# Patient Record
Sex: FEMALE | Race: OTHER | NOT HISPANIC OR LATINO
[De-identification: names, ages, dates, MRNs, and addresses within clinical notes are randomized per-mention and may not be internally consistent; named-entity substitution may affect disease eponyms.]

---

## 2017-03-02 ENCOUNTER — APPOINTMENT (OUTPATIENT)
Dept: PEDIATRICS | Facility: CLINIC | Age: 22
End: 2017-03-02

## 2017-03-02 VITALS
WEIGHT: 146.8 LBS | DIASTOLIC BLOOD PRESSURE: 83 MMHG | SYSTOLIC BLOOD PRESSURE: 119 MMHG | BODY MASS INDEX: 28.45 KG/M2 | HEIGHT: 60.24 IN | HEART RATE: 114 BPM

## 2017-03-02 DIAGNOSIS — E66.3 OVERWEIGHT: ICD-10-CM

## 2017-03-02 DIAGNOSIS — Z00.00 ENCOUNTER FOR GENERAL ADULT MEDICAL EXAMINATION W/OUT ABNORMAL FINDINGS: ICD-10-CM

## 2017-03-02 DIAGNOSIS — F90.9 ATTENTION-DEFICIT HYPERACTIVITY DISORDER, UNSPECIFIED TYPE: ICD-10-CM

## 2017-03-09 LAB
ALT SERPL-CCNC: 11 U/L
AST SERPL-CCNC: 19 U/L
BASOPHILS # BLD AUTO: 0.04 K/UL
BASOPHILS NFR BLD AUTO: 0.4 %
C TRACH RRNA SPEC QL NAA+PROBE: NORMAL
CHOLEST SERPL-MCNC: 173 MG/DL
CHOLEST/HDLC SERPL: 2.3 RATIO
EOSINOPHIL # BLD AUTO: 0.21 K/UL
EOSINOPHIL NFR BLD AUTO: 2.3 %
GLUCOSE BS SERPL-MCNC: 89 MG/DL
HBA1C MFR BLD HPLC: 5.3 %
HCT VFR BLD CALC: 45.1 %
HDLC SERPL-MCNC: 75 MG/DL
HGB BLD-MCNC: 15.2 G/DL
HIV1+2 AB SPEC QL IA.RAPID: NONREACTIVE
IMM GRANULOCYTES NFR BLD AUTO: 0.2 %
LDLC SERPL CALC-MCNC: 85 MG/DL
LYMPHOCYTES # BLD AUTO: 3.1 K/UL
LYMPHOCYTES NFR BLD AUTO: 33.3 %
MAN DIFF?: NORMAL
MCHC RBC-ENTMCNC: 26.5 PG
MCHC RBC-ENTMCNC: 33.7 GM/DL
MCV RBC AUTO: 78.7 FL
MONOCYTES # BLD AUTO: 0.52 K/UL
MONOCYTES NFR BLD AUTO: 5.6 %
N GONORRHOEA RRNA SPEC QL NAA+PROBE: NORMAL
NEUTROPHILS # BLD AUTO: 5.41 K/UL
NEUTROPHILS NFR BLD AUTO: 58.2 %
PLATELET # BLD AUTO: 274 K/UL
RBC # BLD: 5.73 M/UL
RBC # FLD: 14.9 %
SOURCE AMPLIFICATION: NORMAL
TESTOST SERPL-MCNC: 76.7 NG/DL
TRIGL SERPL-MCNC: 64 MG/DL
TSH SERPL-ACNC: 1.25 UIU/ML
WBC # FLD AUTO: 9.3 K/UL

## 2017-03-23 ENCOUNTER — APPOINTMENT (OUTPATIENT)
Dept: PEDIATRICS | Facility: CLINIC | Age: 22
End: 2017-03-23

## 2017-03-23 VITALS — WEIGHT: 145 LBS

## 2017-03-23 DIAGNOSIS — E28.2 POLYCYSTIC OVARIAN SYNDROME: ICD-10-CM

## 2017-03-23 DIAGNOSIS — N92.6 IRREGULAR MENSTRUATION, UNSPECIFIED: ICD-10-CM

## 2017-03-23 LAB
HCG UR QL: NEGATIVE
QUALITY CONTROL: YES

## 2017-03-23 RX ORDER — NORGESTIMATE AND ETHINYL ESTRADIOL 7DAYSX3 LO
0.18/0.215/0.25 KIT ORAL DAILY
Qty: 1 | Refills: 3 | Status: ACTIVE | COMMUNITY
Start: 2017-03-23 | End: 1900-01-01

## 2017-07-10 ENCOUNTER — APPOINTMENT (OUTPATIENT)
Dept: PEDIATRICS | Facility: CLINIC | Age: 22
End: 2017-07-10

## 2020-10-29 ENCOUNTER — INPATIENT (INPATIENT)
Facility: HOSPITAL | Age: 25
LOS: 5 days | Discharge: HOME | DRG: 872 | End: 2020-11-04
Attending: INTERNAL MEDICINE | Admitting: INTERNAL MEDICINE
Payer: COMMERCIAL

## 2020-10-29 VITALS
OXYGEN SATURATION: 97 % | TEMPERATURE: 99 F | HEIGHT: 61.5 IN | WEIGHT: 199.96 LBS | RESPIRATION RATE: 20 BRPM | HEART RATE: 132 BPM

## 2020-10-29 DIAGNOSIS — F31.9 BIPOLAR DISORDER, UNSPECIFIED: Chronic | ICD-10-CM

## 2020-10-29 LAB
ALBUMIN SERPL ELPH-MCNC: 4.7 G/DL — SIGNIFICANT CHANGE UP (ref 3.3–5)
ALP SERPL-CCNC: 119 U/L — SIGNIFICANT CHANGE UP (ref 40–120)
ALT FLD-CCNC: 69 U/L — HIGH (ref 10–45)
ANION GAP SERPL CALC-SCNC: 15 MMOL/L — SIGNIFICANT CHANGE UP (ref 5–17)
ANISOCYTOSIS BLD QL: SLIGHT — SIGNIFICANT CHANGE UP
APPEARANCE UR: ABNORMAL
APTT BLD: 34.4 SEC — SIGNIFICANT CHANGE UP (ref 27.5–35.5)
AST SERPL-CCNC: 64 U/L — HIGH (ref 10–40)
BACTERIA # UR AUTO: ABNORMAL
BASOPHILS # BLD AUTO: 0 K/UL — SIGNIFICANT CHANGE UP (ref 0–0.2)
BASOPHILS NFR BLD AUTO: 0 % — SIGNIFICANT CHANGE UP (ref 0–2)
BILIRUB SERPL-MCNC: 0.3 MG/DL — SIGNIFICANT CHANGE UP (ref 0.2–1.2)
BILIRUB UR-MCNC: NEGATIVE — SIGNIFICANT CHANGE UP
BUN SERPL-MCNC: 11 MG/DL — SIGNIFICANT CHANGE UP (ref 7–23)
CALCIUM SERPL-MCNC: 10.4 MG/DL — SIGNIFICANT CHANGE UP (ref 8.4–10.5)
CHLORIDE SERPL-SCNC: 97 MMOL/L — SIGNIFICANT CHANGE UP (ref 96–108)
CO2 SERPL-SCNC: 20 MMOL/L — LOW (ref 22–31)
COLOR SPEC: YELLOW — SIGNIFICANT CHANGE UP
CREAT SERPL-MCNC: 0.66 MG/DL — SIGNIFICANT CHANGE UP (ref 0.5–1.3)
DIFF PNL FLD: ABNORMAL
EOSINOPHIL # BLD AUTO: 0.54 K/UL — HIGH (ref 0–0.5)
EOSINOPHIL NFR BLD AUTO: 3 % — SIGNIFICANT CHANGE UP (ref 0–6)
EPI CELLS # UR: 2 /HPF — SIGNIFICANT CHANGE UP
GLUCOSE SERPL-MCNC: 103 MG/DL — HIGH (ref 70–99)
GLUCOSE UR QL: NEGATIVE — SIGNIFICANT CHANGE UP
HCT VFR BLD CALC: 43.1 % — SIGNIFICANT CHANGE UP (ref 34.5–45)
HGB BLD-MCNC: 14.3 G/DL — SIGNIFICANT CHANGE UP (ref 11.5–15.5)
HYALINE CASTS # UR AUTO: 17 /LPF — HIGH (ref 0–2)
INR BLD: 1.1 RATIO — SIGNIFICANT CHANGE UP (ref 0.88–1.16)
KETONES UR-MCNC: NEGATIVE — SIGNIFICANT CHANGE UP
LACTATE BLDV-MCNC: 1.8 MMOL/L — SIGNIFICANT CHANGE UP (ref 0.7–2)
LACTATE BLDV-MCNC: 2.2 MMOL/L — HIGH (ref 0.7–2)
LEUKOCYTE ESTERASE UR-ACNC: ABNORMAL
LYMPHOCYTES # BLD AUTO: 13 % — SIGNIFICANT CHANGE UP (ref 13–44)
LYMPHOCYTES # BLD AUTO: 2.36 K/UL — SIGNIFICANT CHANGE UP (ref 1–3.3)
MANUAL SMEAR VERIFICATION: SIGNIFICANT CHANGE UP
MCHC RBC-ENTMCNC: 26 PG — LOW (ref 27–34)
MCHC RBC-ENTMCNC: 33.2 GM/DL — SIGNIFICANT CHANGE UP (ref 32–36)
MCV RBC AUTO: 78.4 FL — LOW (ref 80–100)
METAMYELOCYTES # FLD: 1 % — HIGH (ref 0–0)
MICROCYTES BLD QL: SIGNIFICANT CHANGE UP
MONOCYTES # BLD AUTO: 0.91 K/UL — HIGH (ref 0–0.9)
MONOCYTES NFR BLD AUTO: 5 % — SIGNIFICANT CHANGE UP (ref 2–14)
MYELOCYTES NFR BLD: 1 % — HIGH (ref 0–0)
NEUTROPHILS # BLD AUTO: 11.8 K/UL — HIGH (ref 1.8–7.4)
NEUTROPHILS NFR BLD AUTO: 64 % — SIGNIFICANT CHANGE UP (ref 43–77)
NEUTS BAND # BLD: 1 % — SIGNIFICANT CHANGE UP (ref 0–8)
NITRITE UR-MCNC: NEGATIVE — SIGNIFICANT CHANGE UP
NRBC # BLD: 0 /100 — SIGNIFICANT CHANGE UP (ref 0–0)
PH UR: 5.5 — SIGNIFICANT CHANGE UP (ref 5–8)
PLAT MORPH BLD: NORMAL — SIGNIFICANT CHANGE UP
PLATELET # BLD AUTO: 372 K/UL — SIGNIFICANT CHANGE UP (ref 150–400)
POLYCHROMASIA BLD QL SMEAR: SLIGHT — SIGNIFICANT CHANGE UP
POTASSIUM SERPL-MCNC: 4.5 MMOL/L — SIGNIFICANT CHANGE UP (ref 3.5–5.3)
POTASSIUM SERPL-SCNC: 4.5 MMOL/L — SIGNIFICANT CHANGE UP (ref 3.5–5.3)
PROT SERPL-MCNC: 8.3 G/DL — SIGNIFICANT CHANGE UP (ref 6–8.3)
PROT UR-MCNC: SIGNIFICANT CHANGE UP
PROTHROM AB SERPL-ACNC: 13.1 SEC — SIGNIFICANT CHANGE UP (ref 10.6–13.6)
RBC # BLD: 5.5 M/UL — HIGH (ref 3.8–5.2)
RBC # FLD: 14 % — SIGNIFICANT CHANGE UP (ref 10.3–14.5)
RBC BLD AUTO: ABNORMAL
RBC CASTS # UR COMP ASSIST: 5 /HPF — HIGH (ref 0–4)
SODIUM SERPL-SCNC: 132 MMOL/L — LOW (ref 135–145)
SP GR SPEC: 1.02 — SIGNIFICANT CHANGE UP (ref 1.01–1.02)
STOMATOCYTES BLD QL SMEAR: SLIGHT — SIGNIFICANT CHANGE UP
UROBILINOGEN FLD QL: NEGATIVE — SIGNIFICANT CHANGE UP
VARIANT LYMPHS # BLD: 12 % — HIGH (ref 0–6)
WBC # BLD: 18.15 K/UL — HIGH (ref 3.8–10.5)
WBC # FLD AUTO: 18.15 K/UL — HIGH (ref 3.8–10.5)
WBC UR QL: 51 /HPF — HIGH (ref 0–5)

## 2020-10-29 PROCEDURE — 73590 X-RAY EXAM OF LOWER LEG: CPT | Mod: 26,LT

## 2020-10-29 PROCEDURE — 99220: CPT | Mod: 25

## 2020-10-29 PROCEDURE — 10061 I&D ABSCESS COMP/MULTIPLE: CPT | Mod: LT

## 2020-10-29 RX ORDER — SODIUM CHLORIDE 9 MG/ML
1000 INJECTION INTRAMUSCULAR; INTRAVENOUS; SUBCUTANEOUS ONCE
Refills: 0 | Status: COMPLETED | OUTPATIENT
Start: 2020-10-29 | End: 2020-10-29

## 2020-10-29 RX ORDER — SODIUM CHLORIDE 9 MG/ML
1000 INJECTION INTRAMUSCULAR; INTRAVENOUS; SUBCUTANEOUS
Refills: 0 | Status: DISCONTINUED | OUTPATIENT
Start: 2020-10-29 | End: 2020-10-31

## 2020-10-29 RX ORDER — OXYCODONE HYDROCHLORIDE 5 MG/1
5 TABLET ORAL ONCE
Refills: 0 | Status: DISCONTINUED | OUTPATIENT
Start: 2020-10-29 | End: 2020-10-29

## 2020-10-29 RX ORDER — ACETAMINOPHEN 500 MG
650 TABLET ORAL ONCE
Refills: 0 | Status: COMPLETED | OUTPATIENT
Start: 2020-10-29 | End: 2020-10-29

## 2020-10-29 RX ADMIN — SODIUM CHLORIDE 1000 MILLILITER(S): 9 INJECTION INTRAMUSCULAR; INTRAVENOUS; SUBCUTANEOUS at 20:40

## 2020-10-29 RX ADMIN — Medication 650 MILLIGRAM(S): at 22:42

## 2020-10-29 RX ADMIN — SODIUM CHLORIDE 1000 MILLILITER(S): 9 INJECTION INTRAMUSCULAR; INTRAVENOUS; SUBCUTANEOUS at 21:37

## 2020-10-29 RX ADMIN — Medication 600 MILLIGRAM(S): at 20:35

## 2020-10-29 RX ADMIN — Medication 100 MILLIGRAM(S): at 20:00

## 2020-10-29 RX ADMIN — OXYCODONE HYDROCHLORIDE 5 MILLIGRAM(S): 5 TABLET ORAL at 23:51

## 2020-10-29 RX ADMIN — SODIUM CHLORIDE 1000 MILLILITER(S): 9 INJECTION INTRAMUSCULAR; INTRAVENOUS; SUBCUTANEOUS at 19:24

## 2020-10-29 RX ADMIN — SODIUM CHLORIDE 110 MILLILITER(S): 9 INJECTION INTRAMUSCULAR; INTRAVENOUS; SUBCUTANEOUS at 23:52

## 2020-10-29 RX ADMIN — Medication 650 MILLIGRAM(S): at 19:24

## 2020-10-29 RX ADMIN — SODIUM CHLORIDE 1000 MILLILITER(S): 9 INJECTION INTRAMUSCULAR; INTRAVENOUS; SUBCUTANEOUS at 22:58

## 2020-10-29 NOTE — ED PROVIDER NOTE - OBJECTIVE STATEMENT
25 F with bipolar and ADHD presenting with  L lower extr postule and surrounding cellulitis for 4 days, enlarging since this morning with no crepitus or fever. Reports mild pain 4 days, she then noticed a small lump and noticed redness for the last 3 days. Today it became more painful and warmer. Denies recent skin cuts, antibiotics, fever, sob, chest pain, immune diseases or palpitations. Denies discharge.

## 2020-10-29 NOTE — ED CDU PROVIDER INITIAL DAY NOTE - NS ED ROS FT
GENERAL: No fever or chills  EYES: no change in vision  HEENT: no trouble swallowing or speaking  CARDIAC: no chest pain or palpitations   PULMONARY: no cough or SOB  GI: no abdominal pain, nausea, vomiting, diarrhea  : No changes in urination  SKIN: +right lower extremity redness and pain   NEURO: no headache, numbness, or weakness.  MSK: No joint pain GENERAL: No fever or chills  EYES: no change in vision  HEENT: no trouble swallowing or speaking  CARDIAC: no chest pain or palpitations   PULMONARY: no cough or SOB  GI: no abdominal pain, nausea, vomiting, diarrhea  : No changes in urination  SKIN: +L lower extremity redness and pain   NEURO: no headache, numbness, or weakness.  MSK: No joint pain

## 2020-10-29 NOTE — ED CDU PROVIDER INITIAL DAY NOTE - DETAILS
Left Leg Cellulitis  -IVF  -IV Clindamycin  -Pain control  -Vitals every 4 hours, frequent reevaluation, KRISHNA Mariano  -Repeat blood work  -Wound is packed and would most likely need packing change prior to discharge. With follow up 2 days after packing is changed.

## 2020-10-29 NOTE — ED CDU PROVIDER INITIAL DAY NOTE - ATTENDING CONTRIBUTION TO CARE
I saw and evaluated patient with ACP. I discussed H+P and MDM with ACP. I agree with the statements made by the ACP unless otherwise noted.    The care of this patient was in support of the [facility] countermeasures to Covid-19.

## 2020-10-29 NOTE — ED ADULT NURSE REASSESSMENT NOTE - NS ED NURSE REASSESS COMMENT FT1
I&D to left lower leg abscess done by ED MD with packing in place. CDU PA present at bedside examining pt

## 2020-10-29 NOTE — ED PROVIDER NOTE - ATTENDING CONTRIBUTION TO CARE
I saw and evaluated patient with resident. I discussed H+P and MDM with resident. I agree with the statements made by the resident unless otherwise noted.    The care of this patient was in support of the [facility] countermeasures to Covid-19.

## 2020-10-29 NOTE — ED ADULT NURSE NOTE - OBJECTIVE STATEMENT
Pt c/o "I had been scratching and picking at my leg the past few days and then developed this pustule and redness. No fevers"

## 2020-10-29 NOTE — ED PROVIDER NOTE - CLINICAL SUMMARY MEDICAL DECISION MAKING FREE TEXT BOX
25 F with bipolar and ADHD presenting with  L lower extr postule and surrounding cellulitis for 4 days, enlarging since this morning with no crepitus or fever. Tachycardic at 120. Not febrile, well appearing.  1x1 cm round postule with surrounding erythema 10x12 with TTP, erythema and warmth on the medial aspect of the left lower leg. No crepitus. Neurovascularly intact. Likely cellulitis, but will get additional labs, imaging and frequent monitoring to r/o nec fascitis .osteomyelitis. Will start antibiotics and re-asses 25 F with bipolar and ADHD presenting with  L lower extr postule and surrounding cellulitis for 4 days, enlarging since this morning with no crepitus or fever. Tachycardic at 120. Not febrile, well appearing.  1x1 cm round postule with surrounding erythema 10x12 with TTP, erythema and warmth on the medial aspect of the left lower leg. No crepitus. Neurovascularly intact. Likely cellulitis, but will get additional labs, imaging and frequent monitoring to r/o nec fascitis .osteomyelitis. Will start antibiotics and re-asses    ROSIE Mariano MD: agree with resident statement above. Pt also with abscess to involved leg, will I+D and start empiric abx with mrsa coverage. Likely CDU for observation, further abx

## 2020-10-29 NOTE — ED PROVIDER NOTE - RAPID ASSESSMENT
25y F p/w worsening pustule with surrounding cellulitis and pain x 4 days. She first noticed sx on Sunday. Denies fever. No h/o diabetes. No recent h/o abscesses. **Pt seen in waiting room by qdoc.  Patient to be sent to main ED for full medical evaluation. All orders placed to be followed by MD in main ED** 25y F p/w worsening pustule with surrounding cellulitis and pain x 4 days, rapidly worsening today. She first noticed sx on Sunday. Denies fever. No h/o diabetes. No recent h/o abscesses. Unable to visualize leg on tele screen. Charge RN notified. Pt expedited back for eval given tachycardia and rapid progression of sx. **Pt seen in waiting room by qdoc.  Patient to be sent to main ED for full medical evaluation. All orders placed to be followed by MD in main ED**

## 2020-10-29 NOTE — ED PROVIDER NOTE - PHYSICAL EXAMINATION
Gen: AAOx3, non-toxic  Head: NCAT  HEENT: EOMI, oral mucosa moist, normal conjunctiva  Lung: CTAB, no respiratory distress, no wheezes/rhonchi/rales B/L, speaking in full sentences  CV: RRR, no murmurs, rubs or gallops  Abd: soft, NTND, no guarding, no CVA tenderness  MSK: no visible deformities  Neuro: No focal sensory or motor deficits, normal CN exam   Skin: 1x1 cm round postule with surrounding erythema 10x12 with TTP, erythema and warmth on the medial aspect of the left lower leg. No crepitus. Neurovascularly intact.   Psych: normal affect.

## 2020-10-29 NOTE — ED CDU PROVIDER INITIAL DAY NOTE - PHYSICAL EXAMINATION
Gen: AAOx3, non-toxic  Head: NCAT  HEENT: EOMI, oral mucosa moist, normal conjunctiva  Lung: CTAB, no respiratory distress, no wheezes/rhonchi/rales B/L, speaking in full sentences  CV: RRR, no murmurs, rubs or gallops  Abd: soft, NTND, no guarding, no CVA tenderness  MSK: no visible deformities  Neuro: No focal sensory or motor deficits, normal CN exam   Skin: 1x1 cm round pustule filled with packing with surrounding erythema [id number] with TTP, erythema and warmth on the medial aspect of the left lower leg. No crepitus. Neurovascularly intact.   Psych: normal affect. Gen: AAOx3, non-toxic  Head: NCAT  HEENT: EOMI, oral mucosa moist, normal conjunctiva  Lung: CTAB, no respiratory distress, no wheezes/rhonchi/rales B/L, speaking in full sentences  CV: RRR, no murmurs, rubs or gallops  Abd: soft, NTND, no guarding, no CVA tenderness  MSK: no visible deformities  Neuro: No focal sensory or motor deficits, normal CN exam   Skin: 1x1 cm round induration filled with packing with surrounding erythema [id number] tender to palpation, erythema and warmth on the medial aspect of the left lower leg. No crepitus. Vasc: Left +DP pulse  Psych: normal affect.

## 2020-10-29 NOTE — ED CDU PROVIDER INITIAL DAY NOTE - OBJECTIVE STATEMENT
25 F with bipolar, ADHD, depression presents with  L lower extremity pustule and surrounding redness for 4 days, enlarging since this morning No fever. Reports mild pain 4 days, she then noticed a small lump and noticed redness for the last 3 days. Today it became more painful and warmer. Current pain 8.5/10. Denies recent skin cuts, antibiotics, fever, sob, chest pain, cough, abdominal pain, dysuria, immune diseases or palpitations. Denies discharge. Menstruating at this time. Allergic to Motrin. Baseline heartrate per patient in the low 100s.   Psychiatrist: Dr. Bonilla [phone], Unsure of PCP's name   Believes she takes Adderall, Topamax, and Wellbutrin although unsure of dosages. Has not been taking medications for the last week as she believes it could have made her infection possibly worse.  ED course: Tachycardic. Afebrile. WBC 18.15. Lactate 2.2 decreased to 1.8 after 2L IVF. UA +Blood, bacteria, and leuks. Patient without urinary symptoms at this time and will await final urine cultures. XR tib/fib negative for subcutaneous air. Given IV Clindamycin. Wound I+D with minimal pus, per ED team. Plan for IVF, pain control, IV Clindamycin, with repeat bloodwork in CDU. 25 F with bipolar, ADHD, depression presents with  L lower extremity redness for 4 days, enlarging since this morning No fever. Reports mild pain 4 days, she then noticed a small lump and noticed redness for the last 3 days. Today it became more painful and warmer. Current pain 8.5/10. Denies recent skin cuts, antibiotics, fever, sob, chest pain, cough, abdominal pain, dysuria, immune diseases or palpitations. Denies discharge. Menstruating at this time. Allergic to Motrin. Baseline heartrate per patient in the low 100s.   Psychiatrist: Dr. Bonilla [phone], Unsure of PCP's name   Believes she takes Adderall, Topamax, and Wellbutrin although unsure of dosages. Has not been taking medications for the last week as she believes it could have made her infection possibly worse.  ED course: Tachycardic. Afebrile. WBC 18.15. Lactate 2.2 decreased to 1.8 after 2L IVF. UA +Blood, bacteria, and leuks. Patient without urinary symptoms at this time and will await final urine cultures. XR tib/fib negative for subcutaneous air. Given IV Clindamycin. Wound I+D with minimal pus, per ED team. Plan for IVF, pain control, IV Clindamycin, with repeat bloodwork in CDU.

## 2020-10-29 NOTE — ED CDU PROVIDER INITIAL DAY NOTE - MEDICAL DECISION MAKING DETAILS
ROSIE Mariano MD: Pt with cellulitis/abscess as described above, s/p I+D and started on empiric abx. To CDU for further observation, redosing of abx, reeassessment

## 2020-10-29 NOTE — ED ADULT NURSE NOTE - NSSUHOSCREENINGYN_ED_ALL_ED
10/6/2022      Mary Connolly MD  Physical Medicine and Rehabilitation  [facility], [address] [place] [address]  Dept: [phone]  Dept Fax: [phone]        RE: Consultation for Lalito Vaughn        Dear Virginia Masters, DO,    Thank you very much for the opportunity to see your patient. Attached please find a summary from your patient's recent visit. I appreciate the chance to take care of your patient with you. Please feel free to call me with any questions or concerns. Sincerely,        Rick Baker MD  Electronically Signed on 10/6/2022
Yes - the patient is able to be screened

## 2020-10-30 DIAGNOSIS — L03.119 CELLULITIS OF UNSPECIFIED PART OF LIMB: ICD-10-CM

## 2020-10-30 DIAGNOSIS — Z29.9 ENCOUNTER FOR PROPHYLACTIC MEASURES, UNSPECIFIED: ICD-10-CM

## 2020-10-30 DIAGNOSIS — F32.9 MAJOR DEPRESSIVE DISORDER, SINGLE EPISODE, UNSPECIFIED: ICD-10-CM

## 2020-10-30 DIAGNOSIS — N39.0 URINARY TRACT INFECTION, SITE NOT SPECIFIED: ICD-10-CM

## 2020-10-30 DIAGNOSIS — F90.9 ATTENTION-DEFICIT HYPERACTIVITY DISORDER, UNSPECIFIED TYPE: ICD-10-CM

## 2020-10-30 DIAGNOSIS — E66.01 MORBID (SEVERE) OBESITY DUE TO EXCESS CALORIES: ICD-10-CM

## 2020-10-30 LAB
ALBUMIN SERPL ELPH-MCNC: 3.6 G/DL — SIGNIFICANT CHANGE UP (ref 3.3–5)
ALP SERPL-CCNC: 85 U/L — SIGNIFICANT CHANGE UP (ref 40–120)
ALT FLD-CCNC: 44 U/L — SIGNIFICANT CHANGE UP (ref 10–45)
ANION GAP SERPL CALC-SCNC: 11 MMOL/L — SIGNIFICANT CHANGE UP (ref 5–17)
AST SERPL-CCNC: 35 U/L — SIGNIFICANT CHANGE UP (ref 10–40)
BASE EXCESS BLDV CALC-SCNC: -1.2 MMOL/L — SIGNIFICANT CHANGE UP (ref -2–2)
BASOPHILS # BLD AUTO: 0 K/UL — SIGNIFICANT CHANGE UP (ref 0–0.2)
BASOPHILS NFR BLD AUTO: 0 % — SIGNIFICANT CHANGE UP (ref 0–2)
BILIRUB SERPL-MCNC: 0.2 MG/DL — SIGNIFICANT CHANGE UP (ref 0.2–1.2)
BUN SERPL-MCNC: 8 MG/DL — SIGNIFICANT CHANGE UP (ref 7–23)
CA-I SERPL-SCNC: 1.1 MMOL/L — LOW (ref 1.12–1.3)
CALCIUM SERPL-MCNC: 8.3 MG/DL — LOW (ref 8.4–10.5)
CHLORIDE BLDV-SCNC: 107 MMOL/L — SIGNIFICANT CHANGE UP (ref 96–108)
CHLORIDE SERPL-SCNC: 103 MMOL/L — SIGNIFICANT CHANGE UP (ref 96–108)
CO2 BLDV-SCNC: 24 MMOL/L — SIGNIFICANT CHANGE UP (ref 22–30)
CO2 SERPL-SCNC: 21 MMOL/L — LOW (ref 22–31)
CREAT SERPL-MCNC: 0.68 MG/DL — SIGNIFICANT CHANGE UP (ref 0.5–1.3)
EOSINOPHIL # BLD AUTO: 0 K/UL — SIGNIFICANT CHANGE UP (ref 0–0.5)
EOSINOPHIL NFR BLD AUTO: 0 % — SIGNIFICANT CHANGE UP (ref 0–6)
GAS PNL BLDV: 134 MMOL/L — LOW (ref 135–145)
GAS PNL BLDV: SIGNIFICANT CHANGE UP
GAS PNL BLDV: SIGNIFICANT CHANGE UP
GLUCOSE BLDV-MCNC: 108 MG/DL — HIGH (ref 70–99)
GLUCOSE SERPL-MCNC: 114 MG/DL — HIGH (ref 70–99)
HCO3 BLDV-SCNC: 23 MMOL/L — SIGNIFICANT CHANGE UP (ref 21–29)
HCT VFR BLD CALC: 33.1 % — LOW (ref 34.5–45)
HCT VFR BLDA CALC: 34 % — LOW (ref 39–50)
HGB BLD CALC-MCNC: 11 G/DL — LOW (ref 11.5–15.5)
HGB BLD-MCNC: 10.8 G/DL — LOW (ref 11.5–15.5)
LACTATE BLDV-MCNC: 1.7 MMOL/L — SIGNIFICANT CHANGE UP (ref 0.7–2)
LYMPHOCYTES # BLD AUTO: 1.54 K/UL — SIGNIFICANT CHANGE UP (ref 1–3.3)
LYMPHOCYTES # BLD AUTO: 10 % — LOW (ref 13–44)
MANUAL SMEAR VERIFICATION: SIGNIFICANT CHANGE UP
MCHC RBC-ENTMCNC: 26.5 PG — LOW (ref 27–34)
MCHC RBC-ENTMCNC: 32.6 GM/DL — SIGNIFICANT CHANGE UP (ref 32–36)
MCV RBC AUTO: 81.1 FL — SIGNIFICANT CHANGE UP (ref 80–100)
METAMYELOCYTES # FLD: 1 % — HIGH (ref 0–0)
MONOCYTES # BLD AUTO: 0.31 K/UL — SIGNIFICANT CHANGE UP (ref 0–0.9)
MONOCYTES NFR BLD AUTO: 2 % — SIGNIFICANT CHANGE UP (ref 2–14)
MYELOCYTES NFR BLD: 2 % — HIGH (ref 0–0)
NEUTROPHILS # BLD AUTO: 12.6 K/UL — HIGH (ref 1.8–7.4)
NEUTROPHILS NFR BLD AUTO: 82 % — HIGH (ref 43–77)
NRBC # BLD: 0 /100 — SIGNIFICANT CHANGE UP (ref 0–0)
OTHER CELLS CSF MANUAL: 15 ML/DL — LOW (ref 18–22)
PCO2 BLDV: 38 MMHG — SIGNIFICANT CHANGE UP (ref 35–50)
PH BLDV: 7.4 — SIGNIFICANT CHANGE UP (ref 7.35–7.45)
PLAT MORPH BLD: NORMAL — SIGNIFICANT CHANGE UP
PLATELET # BLD AUTO: 261 K/UL — SIGNIFICANT CHANGE UP (ref 150–400)
PO2 BLDV: 88 MMHG — HIGH (ref 25–45)
POTASSIUM BLDV-SCNC: 3.4 MMOL/L — LOW (ref 3.5–5.3)
POTASSIUM SERPL-MCNC: 3.5 MMOL/L — SIGNIFICANT CHANGE UP (ref 3.5–5.3)
POTASSIUM SERPL-SCNC: 3.5 MMOL/L — SIGNIFICANT CHANGE UP (ref 3.5–5.3)
PROT SERPL-MCNC: 6.3 G/DL — SIGNIFICANT CHANGE UP (ref 6–8.3)
RBC # BLD: 4.08 M/UL — SIGNIFICANT CHANGE UP (ref 3.8–5.2)
RBC # FLD: 14.2 % — SIGNIFICANT CHANGE UP (ref 10.3–14.5)
RBC BLD AUTO: SIGNIFICANT CHANGE UP
SAO2 % BLDV: 97 % — HIGH (ref 67–88)
SARS-COV-2 RNA SPEC QL NAA+PROBE: SIGNIFICANT CHANGE UP
SODIUM SERPL-SCNC: 135 MMOL/L — SIGNIFICANT CHANGE UP (ref 135–145)
VARIANT LYMPHS # BLD: 3 % — SIGNIFICANT CHANGE UP (ref 0–6)
WBC # BLD: 15.36 K/UL — HIGH (ref 3.8–10.5)
WBC # FLD AUTO: 15.36 K/UL — HIGH (ref 3.8–10.5)

## 2020-10-30 PROCEDURE — 99217: CPT | Mod: 24

## 2020-10-30 PROCEDURE — 99223 1ST HOSP IP/OBS HIGH 75: CPT

## 2020-10-30 RX ORDER — SODIUM CHLORIDE 9 MG/ML
1000 INJECTION INTRAMUSCULAR; INTRAVENOUS; SUBCUTANEOUS ONCE
Refills: 0 | Status: COMPLETED | OUTPATIENT
Start: 2020-10-30 | End: 2020-10-30

## 2020-10-30 RX ORDER — TOPIRAMATE 25 MG
1 TABLET ORAL
Qty: 0 | Refills: 0 | DISCHARGE

## 2020-10-30 RX ORDER — TOPIRAMATE 25 MG
100 TABLET ORAL DAILY
Refills: 0 | Status: DISCONTINUED | OUTPATIENT
Start: 2020-10-30 | End: 2020-11-04

## 2020-10-30 RX ORDER — BUPROPION HYDROCHLORIDE 150 MG/1
0 TABLET, EXTENDED RELEASE ORAL
Qty: 0 | Refills: 0 | DISCHARGE

## 2020-10-30 RX ORDER — MORPHINE SULFATE 50 MG/1
2 CAPSULE, EXTENDED RELEASE ORAL ONCE
Refills: 0 | Status: DISCONTINUED | OUTPATIENT
Start: 2020-10-30 | End: 2020-10-30

## 2020-10-30 RX ORDER — ENOXAPARIN SODIUM 100 MG/ML
40 INJECTION SUBCUTANEOUS DAILY
Refills: 0 | Status: DISCONTINUED | OUTPATIENT
Start: 2020-10-30 | End: 2020-11-04

## 2020-10-30 RX ORDER — DIPHENHYDRAMINE HCL 50 MG
25 CAPSULE ORAL ONCE
Refills: 0 | Status: COMPLETED | OUTPATIENT
Start: 2020-10-30 | End: 2020-10-30

## 2020-10-30 RX ORDER — ACETAMINOPHEN 500 MG
975 TABLET ORAL EVERY 8 HOURS
Refills: 0 | Status: DISCONTINUED | OUTPATIENT
Start: 2020-10-30 | End: 2020-11-04

## 2020-10-30 RX ORDER — SODIUM CHLORIDE 9 MG/ML
500 INJECTION INTRAMUSCULAR; INTRAVENOUS; SUBCUTANEOUS ONCE
Refills: 0 | Status: COMPLETED | OUTPATIENT
Start: 2020-10-30 | End: 2020-10-30

## 2020-10-30 RX ORDER — BUPROPION HYDROCHLORIDE 150 MG/1
30 TABLET, EXTENDED RELEASE ORAL
Qty: 0 | Refills: 0 | DISCHARGE

## 2020-10-30 RX ORDER — BUPROPION HYDROCHLORIDE 150 MG/1
150 TABLET, EXTENDED RELEASE ORAL DAILY
Refills: 0 | Status: DISCONTINUED | OUTPATIENT
Start: 2020-10-30 | End: 2020-11-04

## 2020-10-30 RX ORDER — DEXTROAMPHETAMINE SACCHARATE, AMPHETAMINE ASPARTATE, DEXTROAMPHETAMINE SULFATE AND AMPHETAMINE SULFATE 1.875; 1.875; 1.875; 1.875 MG/1; MG/1; MG/1; MG/1
0 TABLET ORAL
Qty: 0 | Refills: 0 | DISCHARGE

## 2020-10-30 RX ORDER — INFLUENZA VIRUS VACCINE 15; 15; 15; 15 UG/.5ML; UG/.5ML; UG/.5ML; UG/.5ML
0.5 SUSPENSION INTRAMUSCULAR ONCE
Refills: 0 | Status: DISCONTINUED | OUTPATIENT
Start: 2020-10-30 | End: 2020-11-04

## 2020-10-30 RX ADMIN — Medication 975 MILLIGRAM(S): at 20:57

## 2020-10-30 RX ADMIN — MORPHINE SULFATE 2 MILLIGRAM(S): 50 CAPSULE, EXTENDED RELEASE ORAL at 04:39

## 2020-10-30 RX ADMIN — Medication 100 MILLIGRAM(S): at 11:47

## 2020-10-30 RX ADMIN — SODIUM CHLORIDE 1000 MILLILITER(S): 9 INJECTION INTRAMUSCULAR; INTRAVENOUS; SUBCUTANEOUS at 05:09

## 2020-10-30 RX ADMIN — Medication 25 MILLIGRAM(S): at 22:13

## 2020-10-30 RX ADMIN — ENOXAPARIN SODIUM 40 MILLIGRAM(S): 100 INJECTION SUBCUTANEOUS at 17:43

## 2020-10-30 RX ADMIN — SODIUM CHLORIDE 110 MILLILITER(S): 9 INJECTION INTRAMUSCULAR; INTRAVENOUS; SUBCUTANEOUS at 17:43

## 2020-10-30 RX ADMIN — SODIUM CHLORIDE 110 MILLILITER(S): 9 INJECTION INTRAMUSCULAR; INTRAVENOUS; SUBCUTANEOUS at 11:48

## 2020-10-30 RX ADMIN — Medication 100 MILLIGRAM(S): at 04:17

## 2020-10-30 RX ADMIN — Medication 100 MILLIGRAM(S): at 21:13

## 2020-10-30 RX ADMIN — Medication 975 MILLIGRAM(S): at 20:27

## 2020-10-30 NOTE — H&P ADULT - EXTREMITIES COMMENTS
LLE with large pustule s/p I&D with packing, surrounding erythema extending past demarcated line, TTP

## 2020-10-30 NOTE — ED CDU PROVIDER DISPOSITION NOTE - ATTENDING CONTRIBUTION TO CARE
25F, pmh bipolar disorder, adhhd, presents with LLE pain, redness, and pustule x 4 days, worsening over that time period. To L medial calf. Also noted to be warm. No f/c, n/v/d, cough, congestion, abd pain, dysuria, hematuria, or other symptoms. In ED pt found to habe wbc 18, lactate 2.2 which improved s/p ivf. Exam noted abscess, I and D performed. Pt started on clinda and sent to cdu for iv abx and observation. PE: NAD, NCAT, MMM, Trachea midline, Normal conjunctiva, lungs CTAB, S1/S2 RRR, Normal perfusion, 2+ radial pulses bilat, Abdomen Soft, NTND, No rebound/guarding, No LE edema. +Erythema, distal LLE, minimal ttp, s/p I and D with packing in place, no active drainage. 2+ DP pulse, sensation intact, full strength and sensation. C/w abscess and associated cellulitis. Pt only complaining of pruritis at site of I and D. 25F, pmh bipolar disorder, adhhd, presents with LLE pain, redness, and pustule x 4 days, worsening over that time period. To L medial calf. Also noted to be warm. No f/c, n/v/d, cough, congestion, abd pain, dysuria, hematuria, or other symptoms. In ED pt found to habe wbc 18, lactate 2.2 which improved s/p ivf. Exam noted abscess, I and D performed. Pt started on clinda and sent to cdu for iv abx and observation. PE: NAD, NCAT, MMM, Trachea midline, Normal conjunctiva, lungs CTAB, S1/S2 RRR, Normal perfusion, 2+ radial pulses bilat, Abdomen Soft, NTND, No rebound/guarding, No LE edema. +Erythema, distal LLE, minimal ttp, s/p I and D with packing in place, no active drainage. 2+ DP pulse, sensation intact, full strength and sensation. C/w abscess and associated cellulitis. Pt only complaining of pruritis at site of I and D. During day pt with progressively worsening redness, warmth. Pt to be admitted for further management. - Uzair Sevilla MD

## 2020-10-30 NOTE — H&P ADULT - HISTORY OF PRESENT ILLNESS
25F with PMH of depression/ADHD presents with LLE pain and swelling X 1 day. Pt reports that she initially noticed redness on her L shin after picking at her legs, which rapidly started to swell then developed a pustule. She reports associated pain with weight bearing on that leg, otherwise denies prior similar episodes, fevers, chills, night sweats, chest pain, palpitations or SOB. She denies hx of frequent infections. Also denies dysuria or urinary frequency and is currently menstruating.  In the ER, she is afebrile but tachycardic, which improved with IVF. She was started on IV clindamycin with no improvement, and is now s/p I&D.

## 2020-10-30 NOTE — ED ADULT NURSE REASSESSMENT NOTE - NURSING SKIN WOUND APPEAR #1
pt presents to ED with complaints of BL feet pain pt was seen in  ED last night as per EMS pt walked into ambulance building requesting ride to hospital dressing covering wound/clean/dry

## 2020-10-30 NOTE — H&P ADULT - NSHPPHYSICALEXAM_GEN_ALL_CORE
T(C): 36.8 (10-30-20 @ 11:23), Max: 37.4 (10-30-20 @ 04:17)  T(F): 98.2 (10-30-20 @ 11:23), Max: 99.4 (10-30-20 @ 04:17)  HR: 102 (10-30-20 @ 11:23) (102 - 132)  BP: 110/72 (10-30-20 @ 11:23) (101/72 - 134/76)  RR: 17 (10-30-20 @ 11:23) (17 - 20)  SpO2: 99% (10-30-20 @ 11:23) (97% - 100%)  Wt(kg): --

## 2020-10-30 NOTE — H&P ADULT - NSICDXPASTMEDICALHX_GEN_ALL_CORE_FT
PAST MEDICAL HISTORY:  ADHD     Bipolar disorder     Depression     No pertinent past medical history

## 2020-10-30 NOTE — H&P ADULT - NSHPREVIEWOFSYSTEMS_GEN_ALL_CORE
CONSTITUTIONAL: No weakness, fevers or chills; no weight loss or weight gain  EYES: No visual changes; No blurry vision  ENT: No vertigo or throat pain   NECK: No pain or stiffness  RESPIRATORY: No cough, wheezing, hemoptysis; No shortness of breath  CARDIOVASCULAR: No chest pain or palpitations, no PUENTE, no orthopnea or PND  GASTROINTESTINAL: No abdominal or epigastric pain. No nausea, vomiting, or hematemesis; No diarrhea or constipation. No melena or hematochezia.  GENITOURINARY: No dysuria, frequency or hematuria  NEUROLOGICAL: No numbness or weakness, no syncope  SKIN: No itching, rashes  PSYCH: No insomnia, no depression  IMMUNOLOGY: No gum bleeding, no lymphadenopathy  ENDOCRINE: No polydipsia, no heat or cold intolerance  RHEUM: No joint pain or swelling, no rash

## 2020-10-30 NOTE — H&P ADULT - ASSESSMENT
25F with PMH of depression/ADHD presents with LLE pain and swelling X 1 day, admitted for management of purulent cellulitis.

## 2020-10-30 NOTE — ED CDU PROVIDER SUBSEQUENT DAY NOTE - PROGRESS NOTE DETAILS
Patient seen at bedside. . Placed patient on pulse ox for continued monitoring of HR, After leaving the room per monitor 108bpm. Patient resting comfortably but endorses 8.5/10 pain, will give Morphine and will reassess.  Will also give IV fluid bolus. Erythema has not spread past demarcated area. Area tender to palpation and warm to touch. Will reevaluate. - Althea Tinajero PA-C HR 104bpm. - Althea Tinajero PA-C Pt resting comfortably. NAD. No complaints. VSS,  at rest. Pt states pain is improving to LLE,  erythema of left leg does not expand beyond demarcated borders. area of erythema warm to touch, abscess with wick in place, scant bloody discharge, no purulence or fluctuance noted. will cont to monitor, 3rd dose of clinda at noon. -Huong Eller PA-C Pt resting comfortably. NAD. No complaints. VSS. Pt with increased warmth/erythema to the anterior portion of LLE extending out side of demarcated area, will admit pt for ID c/s and possible change of abx. -Huong Eller PA-C called Dr. Alvares for admission, asked to place admission under Dr. KONSTANTIN Huddleston name, called Dr. Huddleston to make aware, call was unanswered, unable to leave message for call back. -JARAD MorejonC

## 2020-10-30 NOTE — ED CDU PROVIDER SUBSEQUENT DAY NOTE - PHYSICAL EXAMINATION
Gen: AAOx3, non-toxic  Head: NCAT  HEENT: EOMI, oral mucosa moist, normal conjunctiva  Lung: CTAB, no respiratory distress, no wheezes/rhonchi/rales B/L, speaking in full sentences  CV: RRR, no murmurs, rubs or gallops  Abd: soft, NTND, no guarding, no CVA tenderness  MSK: no visible deformities  Neuro: No focal sensory or motor deficits, normal CN exam   Skin: 1x1 cm round pustule filled with packing with surrounding erythema [id number] with TTP, erythema and warmth on the medial aspect of the left lower leg. No crepitus. Neurovascularly intact.   Psych: normal affect. Gen: AAOx3, non-toxic  Head: NCAT  HEENT: EOMI, oral mucosa moist, normal conjunctiva  Lung: CTAB, no respiratory distress, no wheezes/rhonchi/rales B/L, speaking in full sentences  CV: RRR, no murmurs, rubs or gallops  Abd: soft, NTND, no guarding, no CVA tenderness  MSK: no visible deformities  Neuro: No focal sensory or motor deficits, normal CN exam   Skin: 1x1 cm round induration filled with packing with surrounding erythema [id number] tender to palpation, erythema and warmth on the medial aspect of the left lower leg. No crepitus. Vasc: Left +DP pulse  Psych: normal affect.

## 2020-10-30 NOTE — ED CDU PROVIDER DISPOSITION NOTE - NSFOLLOWUPINSTRUCTIONS_ED_ALL_ED_FT
1. Rest. Stay hydrated.   2. Continue your current home medications. Please take Clindamycin as prescribed. You can take Tylenol 650mg every 6 hours as needed for pain.   3. Follow-up with your medical doctor in 2-3 days.  Bring your results with you for follow-up.  4. Return to the ER if you have any new or worsening symptoms, spreading of redness, discharge from site, increasing pain, inability to move joints, chest pain, difficulty breathing, fevers, chills, weakness, or any other concerns.  5. Please come back to the Emergency room in 2 day for reevaluation of your wound and to change the packing.

## 2020-10-30 NOTE — H&P ADULT - PROBLEM SELECTOR PLAN 1
Meets sepsis criteria based on tacyhycardia and leukocytosis with confirmed source  s/p I&D in ER  -cont clindamycin IV 600mg TID  -f/u blood cultures  -wound care consult  -weight bearing as tolerated   -tylenol PRN for pain; percocet if severe pain given NSAID allergy  -cont IV fluids

## 2020-10-30 NOTE — ED CDU PROVIDER DISPOSITION NOTE - CLINICAL COURSE
25 F with bipolar, ADHD, depression presents with  L lower extremity pustule and surrounding redness for 4 days, enlarging since this morning No fever. Reports mild pain 4 days, she then noticed a small lump and noticed redness for the last 3 days. Today it became more painful and warmer. Current pain 8.5/10. Denies recent skin cuts, antibiotics, fever, sob, chest pain, cough, abdominal pain, dysuria, immune diseases or palpitations. Denies discharge. Menstruating at this time. Allergic to Motrin. Baseline heartrate per patient in the low 100s.   	Psychiatrist: Dr. Bonilla [phone], Unsure of PCP's name   	Believes she takes Adderall, Topamax, and Wellbutrin although unsure of dosages. Has not been taking medications for the last week as she believes it could have made her infection possibly worse.  ED course: Tachycardic. Afebrile. WBC 18.15. Lactate 2.2 decreased to 1.8 after 2L IVF. UA +Blood, bacteria, and leuks. Patient without urinary symptoms at this time and will await final urine cultures. XR tib/fib negative for subcutaneous air. Given IV Clindamycin. Wound I+D with minimal pus, per ED team. Plan for IVF, pain control, IV Clindamycin, with repeat blood work in CDU. 25 F with bipolar, ADHD, depression presents with  L lower extremity pustule and surrounding redness for 4 days, enlarging since this morning No fever. Reports mild pain 4 days, she then noticed a small lump and noticed redness for the last 3 days. Today it became more painful and warmer. Current pain 8.5/10. Denies recent skin cuts, antibiotics, fever, sob, chest pain, cough, abdominal pain, dysuria, immune diseases or palpitations. Denies discharge. Menstruating at this time. Allergic to Motrin. Baseline heartrate per patient in the low 100s.   	Psychiatrist: Dr. Bonilla [phone], Unsure of PCP's name   	Believes she takes Adderall, Topamax, and Wellbutrin although unsure of dosages. Has not been taking medications for the last week as she believes it could have made her infection possibly worse.  ED course: Tachycardic. Afebrile. WBC 18.15. Lactate 2.2 decreased to 1.8 after 2L IVF. UA +Blood, bacteria, and leuks. Patient without urinary symptoms at this time and will await final urine cultures. XR tib/fib negative for subcutaneous air. Given IV Clindamycin. Wound I+D with minimal pus, per ED team. Plan for IVF, pain control, IV Clindamycin, with repeat blood work in CDU.   In CDU  Pt resting comfortably. NAD. No complaints. VSS. Pt with increased warmth/erythema to the anterior portion of LLE extending out side of demarcated area, s/p 3 rounds of iv clinda, admitted pt for ID c/s and possible change of abx. d/w Dr. Sevilla.

## 2020-10-30 NOTE — ED CDU PROVIDER SUBSEQUENT DAY NOTE - HISTORY
No interval changes since initial CDU provider note. Pt complains of pain at site of wound. Will give Oxycodone and reevaluate. NAD  HR 106bmp, improving. Patient receiving IVF, IV Clindamycin for cellulitis.  - EDILBERTO Tinajero No interval changes since initial CDU provider note. Pt complains of pain at site of wound. Will give Oxycodone and reevaluate. NAD  HR 106bpm, improving. Patient receiving IVF, IV Clindamycin for cellulitis.  - EDILBERTO Tinajero

## 2020-10-30 NOTE — ED ADULT NURSE REASSESSMENT NOTE - NS ED NURSE REASSESS COMMENT FT1
Pt received from [place]. Pt oriented to CDU & plan of care was discussed. Pt A&O x 4. Pt in CDU for IVF, IV abx, pain control, repeat blood work. Pt c/o left leg pain, medicated as per MAR. Pt has round pustule filled with packing and cover with dressing to her left leg, surrounding area is red, and warm to touch. Pt denies any chills or fever. V/S stable, pt afebrile,  IV in place, patent and free of signs of infiltration. Pt resting in bed. Safety & comfort measures maintained. Call bell in reach. Will continue to monitor.

## 2020-10-30 NOTE — ED CDU PROVIDER SUBSEQUENT DAY NOTE - NS ED ROS FT
GENERAL: No fever or chills  EYES: no change in vision  HEENT: no trouble swallowing or speaking  CARDIAC: no chest pain or palpitations   PULMONARY: no cough or SOB  GI: no abdominal pain, nausea, vomiting, diarrhea  : No changes in urination  SKIN: +L lower extremity redness and pain   NEURO: no headache, numbness, or weakness.  MSK: No joint pain

## 2020-10-30 NOTE — H&P ADULT - PROBLEM SELECTOR PLAN 2
UA with LE, bacteria, blood - likely contaminated due to active menstruation  Pt is asymptomatic; no indication to treat.

## 2020-10-31 LAB
ANION GAP SERPL CALC-SCNC: 9 MMOL/L — SIGNIFICANT CHANGE UP (ref 5–17)
BUN SERPL-MCNC: 9 MG/DL — SIGNIFICANT CHANGE UP (ref 7–23)
CALCIUM SERPL-MCNC: 9.2 MG/DL — SIGNIFICANT CHANGE UP (ref 8.4–10.5)
CHLORIDE SERPL-SCNC: 102 MMOL/L — SIGNIFICANT CHANGE UP (ref 96–108)
CO2 SERPL-SCNC: 23 MMOL/L — SIGNIFICANT CHANGE UP (ref 22–31)
CREAT SERPL-MCNC: 0.62 MG/DL — SIGNIFICANT CHANGE UP (ref 0.5–1.3)
CULTURE RESULTS: SIGNIFICANT CHANGE UP
GLUCOSE SERPL-MCNC: 90 MG/DL — SIGNIFICANT CHANGE UP (ref 70–99)
HCT VFR BLD CALC: 35.7 % — SIGNIFICANT CHANGE UP (ref 34.5–45)
HGB BLD-MCNC: 11.7 G/DL — SIGNIFICANT CHANGE UP (ref 11.5–15.5)
MCHC RBC-ENTMCNC: 26.1 PG — LOW (ref 27–34)
MCHC RBC-ENTMCNC: 32.8 GM/DL — SIGNIFICANT CHANGE UP (ref 32–36)
MCV RBC AUTO: 79.5 FL — LOW (ref 80–100)
NRBC # BLD: 0 /100 WBCS — SIGNIFICANT CHANGE UP (ref 0–0)
PLATELET # BLD AUTO: 295 K/UL — SIGNIFICANT CHANGE UP (ref 150–400)
POTASSIUM SERPL-MCNC: 3.9 MMOL/L — SIGNIFICANT CHANGE UP (ref 3.5–5.3)
POTASSIUM SERPL-SCNC: 3.9 MMOL/L — SIGNIFICANT CHANGE UP (ref 3.5–5.3)
RBC # BLD: 4.49 M/UL — SIGNIFICANT CHANGE UP (ref 3.8–5.2)
RBC # FLD: 14.2 % — SIGNIFICANT CHANGE UP (ref 10.3–14.5)
SODIUM SERPL-SCNC: 134 MMOL/L — LOW (ref 135–145)
SPECIMEN SOURCE: SIGNIFICANT CHANGE UP
WBC # BLD: 10.94 K/UL — HIGH (ref 3.8–10.5)
WBC # FLD AUTO: 10.94 K/UL — HIGH (ref 3.8–10.5)

## 2020-10-31 PROCEDURE — 99222 1ST HOSP IP/OBS MODERATE 55: CPT

## 2020-10-31 PROCEDURE — 99233 SBSQ HOSP IP/OBS HIGH 50: CPT

## 2020-10-31 RX ADMIN — Medication 100 MILLIGRAM(S): at 05:46

## 2020-10-31 RX ADMIN — ENOXAPARIN SODIUM 40 MILLIGRAM(S): 100 INJECTION SUBCUTANEOUS at 11:55

## 2020-10-31 RX ADMIN — Medication 100 MILLIGRAM(S): at 21:34

## 2020-10-31 RX ADMIN — BUPROPION HYDROCHLORIDE 150 MILLIGRAM(S): 150 TABLET, EXTENDED RELEASE ORAL at 12:57

## 2020-10-31 RX ADMIN — Medication 100 MILLIGRAM(S): at 13:31

## 2020-10-31 RX ADMIN — Medication 100 MILLIGRAM(S): at 12:59

## 2020-10-31 NOTE — PROGRESS NOTE ADULT - PROBLEM SELECTOR PLAN 1
Meets sepsis criteria based on tacyhycardia and leukocytosis with confirmed source  s/p I&D in ER  -cont clindamycin IV 600mg TID  - Blood cultures NGTD.  -wound care consult pending  -weight bearing as tolerated   -tylenol PRN for pain; percocet if severe pain given NSAID allergy  -cont IV fluids  - Monitor on IV Abx today

## 2020-10-31 NOTE — CHART NOTE - NSCHARTNOTEFT_GEN_A_CORE
Call by RN to see patient with left forearm swelling s/p IV fluid infusion.  Patient seen and examined at bedside   left forearm to hand with edema, there is a small puncture where possible blood draw to hand as well as a small puncture  where the IV site was place. The hand is soft, warm to touch.   This appears to be an IV infiltrate vs infection. Patient is currently receiving   Clindamycin 600 mg IVSS every 8 hrs.   Patient remains afebrile, for now with apply warm compress and elevate forearm on pillows.    Will continue to observe

## 2020-10-31 NOTE — PROGRESS NOTE ADULT - SUBJECTIVE AND OBJECTIVE BOX
Patient is a 25y old  Female who presents with a chief complaint of Cellulitis (30 Oct 2020 15:58)      SUBJECTIVE / OVERNIGHT EVENTS:  Still reports drainage of LLE wound.  Some tenderness of warmth,  Non toxic. Afebrile and leukocytosis reoslving    MEDICATIONS  (STANDING):  buPROPion XL . 150 milliGRAM(s) Oral daily  clindamycin IVPB 600 milliGRAM(s) IV Intermittent every 8 hours  enoxaparin Injectable 40 milliGRAM(s) SubCutaneous daily  influenza   Vaccine 0.5 milliLiter(s) IntraMuscular once  sodium chloride 0.9%. 1000 milliLiter(s) (110 mL/Hr) IV Continuous <Continuous>  topiramate 100 milliGRAM(s) Oral daily    MEDICATIONS  (PRN):  acetaminophen   Tablet .. 975 milliGRAM(s) Oral every 8 hours PRN Moderate Pain (4 - 6)      CAPILLARY BLOOD GLUCOSE        I&O's Summary    30 Oct 2020 07:01  -  31 Oct 2020 07:00  --------------------------------------------------------  IN: 1520 mL / OUT: 0 mL / NET: 1520 mL    31 Oct 2020 08:01  -  31 Oct 2020 14:39  --------------------------------------------------------  IN: 500 mL / OUT: 0 mL / NET: 500 mL        PHYSICAL EXAM:  Vital Signs Last 24 Hrs  T(C): 36.8 (31 Oct 2020 12:52), Max: 36.8 (31 Oct 2020 12:52)  T(F): 98.3 (31 Oct 2020 12:52), Max: 98.3 (31 Oct 2020 12:52)  HR: 85 (31 Oct 2020 12:52) (80 - 98)  BP: 105/72 (31 Oct 2020 12:52) (99/65 - 123/83)  BP(mean): --  RR: 18 (31 Oct 2020 12:52) (18 - 18)  SpO2: 96% (31 Oct 2020 12:52) (96% - 98%)  GENERAL: NAD, well-developed  HEAD:  Atraumatic, Normocephalic  EYES: EOMI, PERRLA, conjunctiva and sclera clear  NECK: Supple, No JVD  CHEST/LUNG: Clear to auscultation bilaterally; No wheeze  HEART: Regular rate and rhythm; No murmurs, rubs, or gallops  ABDOMEN: Soft, Nontender, Nondistended; Bowel sounds present  EXTREMITIES:  2+ Peripheral Pulses, No clubbing, cyanosis, or edema. LLE bandaged  PSYCH: AAOx3  NEUROLOGY: non-focal  SKIN: No rashes or lesions    LABS:                        11.7   10.94 )-----------( 295      ( 31 Oct 2020 07:40 )             35.7     10-31    134<L>  |  102  |  9   ----------------------------<  90  3.9   |  23  |  0.62    Ca    9.2      31 Oct 2020 07:38    TPro  6.3  /  Alb  3.6  /  TBili  0.2  /  DBili  x   /  AST  35  /  ALT  44  /  AlkPhos  85  10-30    PT/INR - ( 29 Oct 2020 19:16 )   PT: 13.1 sec;   INR: 1.10 ratio         PTT - ( 29 Oct 2020 19:16 )  PTT:34.4 sec      Urinalysis Basic - ( 29 Oct 2020 20:21 )    Color: Yellow / Appearance: Slightly Turbid / S.023 / pH: x  Gluc: x / Ketone: Negative  / Bili: Negative / Urobili: Negative   Blood: x / Protein: Trace / Nitrite: Negative   Leuk Esterase: Large / RBC: 5 /hpf / WBC 51 /HPF   Sq Epi: x / Non Sq Epi: 2 /hpf / Bacteria: Moderate        RADIOLOGY & ADDITIONAL TESTS:    Imaging Personally Reviewed:    Consultant(s) Notes Reviewed:      Care Discussed with Consultants/Other Providers:

## 2020-11-01 LAB
ANION GAP SERPL CALC-SCNC: 15 MMOL/L — SIGNIFICANT CHANGE UP (ref 5–17)
BUN SERPL-MCNC: 14 MG/DL — SIGNIFICANT CHANGE UP (ref 7–23)
CALCIUM SERPL-MCNC: 9.8 MG/DL — SIGNIFICANT CHANGE UP (ref 8.4–10.5)
CHLORIDE SERPL-SCNC: 101 MMOL/L — SIGNIFICANT CHANGE UP (ref 96–108)
CO2 SERPL-SCNC: 20 MMOL/L — LOW (ref 22–31)
CREAT SERPL-MCNC: 0.87 MG/DL — SIGNIFICANT CHANGE UP (ref 0.5–1.3)
GLUCOSE SERPL-MCNC: 94 MG/DL — SIGNIFICANT CHANGE UP (ref 70–99)
HCT VFR BLD CALC: 39 % — SIGNIFICANT CHANGE UP (ref 34.5–45)
HGB BLD-MCNC: 12.6 G/DL — SIGNIFICANT CHANGE UP (ref 11.5–15.5)
MCHC RBC-ENTMCNC: 26.2 PG — LOW (ref 27–34)
MCHC RBC-ENTMCNC: 32.3 GM/DL — SIGNIFICANT CHANGE UP (ref 32–36)
MCV RBC AUTO: 81.1 FL — SIGNIFICANT CHANGE UP (ref 80–100)
NRBC # BLD: 0 /100 WBCS — SIGNIFICANT CHANGE UP (ref 0–0)
PLATELET # BLD AUTO: 333 K/UL — SIGNIFICANT CHANGE UP (ref 150–400)
POTASSIUM SERPL-MCNC: 3.9 MMOL/L — SIGNIFICANT CHANGE UP (ref 3.5–5.3)
POTASSIUM SERPL-SCNC: 3.9 MMOL/L — SIGNIFICANT CHANGE UP (ref 3.5–5.3)
RBC # BLD: 4.81 M/UL — SIGNIFICANT CHANGE UP (ref 3.8–5.2)
RBC # FLD: 14.1 % — SIGNIFICANT CHANGE UP (ref 10.3–14.5)
SODIUM SERPL-SCNC: 136 MMOL/L — SIGNIFICANT CHANGE UP (ref 135–145)
WBC # BLD: 12.81 K/UL — HIGH (ref 3.8–10.5)
WBC # FLD AUTO: 12.81 K/UL — HIGH (ref 3.8–10.5)

## 2020-11-01 PROCEDURE — 99233 SBSQ HOSP IP/OBS HIGH 50: CPT

## 2020-11-01 RX ORDER — MUPIROCIN 20 MG/G
1 OINTMENT TOPICAL
Refills: 0 | Status: DISCONTINUED | OUTPATIENT
Start: 2020-11-01 | End: 2020-11-04

## 2020-11-01 RX ORDER — MORPHINE SULFATE 50 MG/1
2 CAPSULE, EXTENDED RELEASE ORAL EVERY 4 HOURS
Refills: 0 | Status: DISCONTINUED | OUTPATIENT
Start: 2020-11-01 | End: 2020-11-04

## 2020-11-01 RX ADMIN — ENOXAPARIN SODIUM 40 MILLIGRAM(S): 100 INJECTION SUBCUTANEOUS at 11:35

## 2020-11-01 RX ADMIN — Medication 100 MILLIGRAM(S): at 12:37

## 2020-11-01 RX ADMIN — Medication 100 MILLIGRAM(S): at 05:14

## 2020-11-01 RX ADMIN — Medication 100 MILLIGRAM(S): at 13:00

## 2020-11-01 RX ADMIN — MUPIROCIN 1 APPLICATION(S): 20 OINTMENT TOPICAL at 19:18

## 2020-11-01 RX ADMIN — MORPHINE SULFATE 2 MILLIGRAM(S): 50 CAPSULE, EXTENDED RELEASE ORAL at 21:18

## 2020-11-01 RX ADMIN — Medication 100 MILLIGRAM(S): at 21:18

## 2020-11-01 RX ADMIN — MORPHINE SULFATE 2 MILLIGRAM(S): 50 CAPSULE, EXTENDED RELEASE ORAL at 21:48

## 2020-11-01 RX ADMIN — BUPROPION HYDROCHLORIDE 150 MILLIGRAM(S): 150 TABLET, EXTENDED RELEASE ORAL at 11:35

## 2020-11-01 NOTE — CONSULT NOTE ADULT - SUBJECTIVE AND OBJECTIVE BOX
General Surgery Consult Note    Attending: Dr. Landrum  Service: ACS  [id number]      HPI: 25F who presented with left lower extremity pustule s/p I&D in the ED and started on IV antibiotics. Surgery consulted for worsening swelling, pain, and erythema.     Upon evaluation by surgical team, patient was afebrile, hemodynamically stable, labs remarkable for WBC of 12.8.     No fevers/chills, nausea/vomiting, chest pain/shortness of breath, or dizziness/lightheadedness.    PAST MEDICAL & SURGICAL HISTORY:  ADHD  Depression  Bipolar disorder  No pertinent past medical history    ALLERGIES:  Motrin (Vomiting)    FAMILY HISTORY:  No pertinent family history in first degree relatives    PHYSICAL EXAM:  General: NAD, resting comfortably  HEENT: NC/AT, EOMI, normal hearing, no oral lesions, no LAD, neck supple  Pulmonary: normal resp effort, CTA-B  Cardiovascular: NSR, no murmurs  Abdominal: soft, ND/NT, no organomegaly  Extremities: WWP, normal strength, no clubbing/cyanosis/edema. Left lower extremity medial [id number] wound with surrounding induration.  Pulses: palpable distal pulses    VITAL SIGNS:  Vital Signs Last 24 Hrs  T(C): 36.9 (01 Nov 2020 16:46), Max: 37.1 (31 Oct 2020 20:54)  T(F): 98.4 (01 Nov 2020 16:46), Max: 98.8 (31 Oct 2020 20:54)  HR: 96 (01 Nov 2020 16:46) (84 - 99)  BP: 112/79 (01 Nov 2020 16:46) (96/61 - 124/80)  BP(mean): --  RR: 18 (01 Nov 2020 16:46) (18 - 18)  SpO2: 98% (01 Nov 2020 16:46) (97% - 99%)    I&O's Summary    31 Oct 2020 08:01  -  01 Nov 2020 07:00  --------------------------------------------------------  IN: 2610 mL / OUT: 0 mL / NET: 2610 mL    01 Nov 2020 07:01  -  01 Nov 2020 19:57  --------------------------------------------------------  IN: 1470 mL / OUT: 0 mL / NET: 1470 mL    LABS:                        12.6   12.81 )-----------( 333      ( 01 Nov 2020 06:53 )             39.0     11-01    136  |  101  |  14  ----------------------------<  94  3.9   |  20<L>  |  0.87    Ca    9.8      01 Nov 2020 06:52

## 2020-11-01 NOTE — CONSULT NOTE ADULT - ASSESSMENT
25F who presented with left lower extremity pustule s/p I&D in the ED and started on IV antibiotics. Surgery consulted for worsening swelling, pain, and erythema.     PLAN:  - Recommend CT scan of left lower extremity to rule out soft tissue abscess/infection.  - Surgery will follow CT results and plan for +/- I&D.    Discussed with Acute Care Surgery attending surgeon Dr. Landrum.    JEWEL Lawton, PGY-2  [facility]  Acute Care Surgery  [id number]

## 2020-11-01 NOTE — PROGRESS NOTE ADULT - PROBLEM SELECTOR PLAN 1
Meets sepsis criteria based on tacyhycardia and leukocytosis with confirmed source  s/p I&D in ER  -cont clindamycin IV 600mg TID  - Blood cultures NGTD.  -wound care consult pending  -weight bearing as tolerated   -tylenol PRN for pain; percocet if severe pain given NSAID allergy  -cont IV fluids  - Continue with IV Clindamycin.  - Surgery evaluation for possibility of more drainage.  - Morphine 2mg IV prn pain.

## 2020-11-01 NOTE — PROGRESS NOTE ADULT - SUBJECTIVE AND OBJECTIVE BOX
Patient is a 25y old  Female who presents with a chief complaint of Cellulitis (30 Oct 2020 15:58)      SUBJECTIVE / OVERNIGHT EVENTS:  Still reports drainage of LLE wound.  Some tenderness of warmth,  Non toxic. Leukocytosis up-trending    MEDICATIONS  (STANDING):  buPROPion XL . 150 milliGRAM(s) Oral daily  clindamycin IVPB 600 milliGRAM(s) IV Intermittent every 8 hours  enoxaparin Injectable 40 milliGRAM(s) SubCutaneous daily  influenza   Vaccine 0.5 milliLiter(s) IntraMuscular once  sodium chloride 0.9%. 1000 milliLiter(s) (110 mL/Hr) IV Continuous <Continuous>  topiramate 100 milliGRAM(s) Oral daily    MEDICATIONS  (PRN):  acetaminophen   Tablet .. 975 milliGRAM(s) Oral every 8 hours PRN Moderate Pain (4 - 6)      CAPILLARY BLOOD GLUCOSE        I&O's Summary    30 Oct 2020 07:01  -  31 Oct 2020 07:00  --------------------------------------------------------  IN: 1520 mL / OUT: 0 mL / NET: 1520 mL    31 Oct 2020 08:01  -  31 Oct 2020 14:39  --------------------------------------------------------  IN: 500 mL / OUT: 0 mL / NET: 500 mL        PHYSICAL EXAM:  Vital Signs Last 24 Hrs  T(C): 36.8 (31 Oct 2020 12:52), Max: 36.8 (31 Oct 2020 12:52)  T(F): 98.3 (31 Oct 2020 12:52), Max: 98.3 (31 Oct 2020 12:52)  HR: 85 (31 Oct 2020 12:52) (80 - 98)  BP: 105/72 (31 Oct 2020 12:52) (99/65 - 123/83)  BP(mean): --  RR: 18 (31 Oct 2020 12:52) (18 - 18)  SpO2: 96% (31 Oct 2020 12:52) (96% - 98%)  GENERAL: NAD, well-developed  HEAD:  Atraumatic, Normocephalic  EYES: EOMI, PERRLA, conjunctiva and sclera clear  NECK: Supple, No JVD  CHEST/LUNG: Clear to auscultation bilaterally; No wheeze  HEART: Regular rate and rhythm; No murmurs, rubs, or gallops  ABDOMEN: Soft, Nontender, Nondistended; Bowel sounds present  EXTREMITIES:  2+ Peripheral Pulses, No clubbing, cyanosis, or edema. LLE wound more firm and tender. + warmth  PSYCH: AAOx3  NEUROLOGY: non-focal  SKIN: No rashes or lesions    LABS:                        11.7   10.94 )-----------( 295      ( 31 Oct 2020 07:40 )             35.7     10-31    134<L>  |  102  |  9   ----------------------------<  90  3.9   |  23  |  0.62    Ca    9.2      31 Oct 2020 07:38    TPro  6.3  /  Alb  3.6  /  TBili  0.2  /  DBili  x   /  AST  35  /  ALT  44  /  AlkPhos  85  10-30    PT/INR - ( 29 Oct 2020 19:16 )   PT: 13.1 sec;   INR: 1.10 ratio         PTT - ( 29 Oct 2020 19:16 )  PTT:34.4 sec      Urinalysis Basic - ( 29 Oct 2020 20:21 )    Color: Yellow / Appearance: Slightly Turbid / S.023 / pH: x  Gluc: x / Ketone: Negative  / Bili: Negative / Urobili: Negative   Blood: x / Protein: Trace / Nitrite: Negative   Leuk Esterase: Large / RBC: 5 /hpf / WBC 51 /HPF   Sq Epi: x / Non Sq Epi: 2 /hpf / Bacteria: Moderate        RADIOLOGY & ADDITIONAL TESTS:    Imaging Personally Reviewed:    Consultant(s) Notes Reviewed:      Care Discussed with Consultants/Other Providers:

## 2020-11-01 NOTE — PROVIDER CONTACT NOTE (OTHER) - ASSESSMENT
Pt was asleep before PCA came to take VS. PT states she is tired and wants to resume sleeping. All other VSS.

## 2020-11-01 NOTE — CONSULT NOTE ADULT - ATTENDING COMMENTS
this is a complex wound of the left lower extremity  would recommend CT scan to further understand extent and to help determine of surgical debridement is needed

## 2020-11-02 LAB
ANION GAP SERPL CALC-SCNC: 17 MMOL/L — SIGNIFICANT CHANGE UP (ref 5–17)
BUN SERPL-MCNC: 17 MG/DL — SIGNIFICANT CHANGE UP (ref 7–23)
CALCIUM SERPL-MCNC: 10.4 MG/DL — SIGNIFICANT CHANGE UP (ref 8.4–10.5)
CHLORIDE SERPL-SCNC: 99 MMOL/L — SIGNIFICANT CHANGE UP (ref 96–108)
CO2 SERPL-SCNC: 20 MMOL/L — LOW (ref 22–31)
CREAT SERPL-MCNC: 0.96 MG/DL — SIGNIFICANT CHANGE UP (ref 0.5–1.3)
GLUCOSE SERPL-MCNC: 105 MG/DL — HIGH (ref 70–99)
HCT VFR BLD CALC: 43.1 % — SIGNIFICANT CHANGE UP (ref 34.5–45)
HGB BLD-MCNC: 13.7 G/DL — SIGNIFICANT CHANGE UP (ref 11.5–15.5)
MCHC RBC-ENTMCNC: 25.9 PG — LOW (ref 27–34)
MCHC RBC-ENTMCNC: 31.8 GM/DL — LOW (ref 32–36)
MCV RBC AUTO: 81.5 FL — SIGNIFICANT CHANGE UP (ref 80–100)
NRBC # BLD: 0 /100 WBCS — SIGNIFICANT CHANGE UP (ref 0–0)
PLATELET # BLD AUTO: 448 K/UL — HIGH (ref 150–400)
POTASSIUM SERPL-MCNC: 3.7 MMOL/L — SIGNIFICANT CHANGE UP (ref 3.5–5.3)
POTASSIUM SERPL-SCNC: 3.7 MMOL/L — SIGNIFICANT CHANGE UP (ref 3.5–5.3)
RBC # BLD: 5.29 M/UL — HIGH (ref 3.8–5.2)
RBC # FLD: 14.1 % — SIGNIFICANT CHANGE UP (ref 10.3–14.5)
SODIUM SERPL-SCNC: 136 MMOL/L — SIGNIFICANT CHANGE UP (ref 135–145)
WBC # BLD: 13.97 K/UL — HIGH (ref 3.8–10.5)
WBC # FLD AUTO: 13.97 K/UL — HIGH (ref 3.8–10.5)

## 2020-11-02 PROCEDURE — 73701 CT LOWER EXTREMITY W/DYE: CPT | Mod: 26,LT

## 2020-11-02 PROCEDURE — 99233 SBSQ HOSP IP/OBS HIGH 50: CPT

## 2020-11-02 RX ADMIN — MUPIROCIN 1 APPLICATION(S): 20 OINTMENT TOPICAL at 05:00

## 2020-11-02 RX ADMIN — MUPIROCIN 1 APPLICATION(S): 20 OINTMENT TOPICAL at 18:28

## 2020-11-02 RX ADMIN — BUPROPION HYDROCHLORIDE 150 MILLIGRAM(S): 150 TABLET, EXTENDED RELEASE ORAL at 12:31

## 2020-11-02 RX ADMIN — MORPHINE SULFATE 2 MILLIGRAM(S): 50 CAPSULE, EXTENDED RELEASE ORAL at 12:31

## 2020-11-02 RX ADMIN — ENOXAPARIN SODIUM 40 MILLIGRAM(S): 100 INJECTION SUBCUTANEOUS at 12:31

## 2020-11-02 RX ADMIN — Medication 100 MILLIGRAM(S): at 05:00

## 2020-11-02 RX ADMIN — Medication 100 MILLIGRAM(S): at 21:15

## 2020-11-02 RX ADMIN — MORPHINE SULFATE 2 MILLIGRAM(S): 50 CAPSULE, EXTENDED RELEASE ORAL at 18:26

## 2020-11-02 RX ADMIN — MORPHINE SULFATE 2 MILLIGRAM(S): 50 CAPSULE, EXTENDED RELEASE ORAL at 12:50

## 2020-11-02 RX ADMIN — Medication 100 MILLIGRAM(S): at 12:31

## 2020-11-02 RX ADMIN — Medication 100 MILLIGRAM(S): at 14:45

## 2020-11-02 NOTE — PROGRESS NOTE ADULT - PROBLEM SELECTOR PLAN 1
- s/p I&D in ER  - c/w clindamycin IV 600mg TID  - Blood cultures NGTD  - wound care consult pending  - weight bearing as tolerated   - tylenol PRN for pain; percocet if severe pain given NSAID allergy  - Surgery evaluation-- ct left leg with contrast pending  - left hand swelling-- US ordered   - Morphine 2mg IV prn pain.

## 2020-11-02 NOTE — PROGRESS NOTE ADULT - SUBJECTIVE AND OBJECTIVE BOX
Patient continues to have pain in left leg and now complaining of pain and swelling in left hand.    GENERAL: No fevers, no chills.  EYES: No blurry vision,  No photophobia  ENT: No sore throat.  No dysphagia  Cardiovascular: No chest pain, palpitations, orthopnea  Pulmonary: No cough, no wheezing. No shortness of breath  Gastrointestinal: No abdominal pain, no diarrhea, no constipation.    Musculoskeletal: No weakness.  No myalgias.  Dermatology:  No rashes.  Neuro: No Headache.  No vertigo.  No dizziness.  Psych: No anxiety, no depression.  Denies suicidal thoughts.    MEDICATIONS  (STANDING):  buPROPion XL . 150 milliGRAM(s) Oral daily  clindamycin IVPB 600 milliGRAM(s) IV Intermittent every 8 hours  enoxaparin Injectable 40 milliGRAM(s) SubCutaneous daily  influenza   Vaccine 0.5 milliLiter(s) IntraMuscular once  mupirocin 2% Ointment 1 Application(s) Topical two times a day  topiramate 100 milliGRAM(s) Oral daily    MEDICATIONS  (PRN):  acetaminophen   Tablet .. 975 milliGRAM(s) Oral every 8 hours PRN Moderate Pain (4 - 6)  morphine  - Injectable 2 milliGRAM(s) IV Push every 4 hours PRN Severe Pain (7 - 10)    Vital Signs Last 24 Hrs  T(C): 37 (02 Nov 2020 13:38), Max: 37.2 (01 Nov 2020 21:22)  T(F): 98.6 (02 Nov 2020 13:38), Max: 98.9 (01 Nov 2020 21:22)  HR: 94 (02 Nov 2020 13:38) (92 - 99)  BP: 104/72 (02 Nov 2020 13:38) (104/72 - 112/79)  BP(mean): --  RR: 18 (02 Nov 2020 13:38) (18 - 18)  SpO2: 99% (02 Nov 2020 13:38) (96% - 99%)    GENERAL: NAD, well-developed  HEAD:  Atraumatic, Normocephalic  EYES: EOMI, PERRLA, conjunctiva and sclera clear  NECK: Supple, No JVD  CHEST/LUNG: Clear to auscultation bilaterally; No wheeze  HEART: Regular rate and rhythm; No murmurs, rubs, or gallops  ABDOMEN: Soft, Nontender, Nondistended; Bowel sounds present  EXTREMITIES:  2+ Peripheral Pulses, No clubbing, cyanosis, or edema. LLE wound more firm and tender. + warmth  PSYCH: AAOx3  NEUROLOGY: non-focal  SKIN: No rashes or lesions    .  LABS:                         13.7   13.97 )-----------( 448      ( 02 Nov 2020 07:14 )             43.1     11-02    136  |  99  |  17  ----------------------------<  105<H>  3.7   |  20<L>  |  0.96    Ca    10.4      02 Nov 2020 07:13                RADIOLOGY, EKG & ADDITIONAL TESTS: Reviewed.

## 2020-11-03 LAB
ANION GAP SERPL CALC-SCNC: 13 MMOL/L — SIGNIFICANT CHANGE UP (ref 5–17)
BUN SERPL-MCNC: 13 MG/DL — SIGNIFICANT CHANGE UP (ref 7–23)
CALCIUM SERPL-MCNC: 10.1 MG/DL — SIGNIFICANT CHANGE UP (ref 8.4–10.5)
CHLORIDE SERPL-SCNC: 100 MMOL/L — SIGNIFICANT CHANGE UP (ref 96–108)
CO2 SERPL-SCNC: 22 MMOL/L — SIGNIFICANT CHANGE UP (ref 22–31)
CREAT SERPL-MCNC: 0.89 MG/DL — SIGNIFICANT CHANGE UP (ref 0.5–1.3)
CULTURE RESULTS: SIGNIFICANT CHANGE UP
CULTURE RESULTS: SIGNIFICANT CHANGE UP
GLUCOSE SERPL-MCNC: 97 MG/DL — SIGNIFICANT CHANGE UP (ref 70–99)
HCT VFR BLD CALC: 39.7 % — SIGNIFICANT CHANGE UP (ref 34.5–45)
HGB BLD-MCNC: 12.8 G/DL — SIGNIFICANT CHANGE UP (ref 11.5–15.5)
MCHC RBC-ENTMCNC: 25.9 PG — LOW (ref 27–34)
MCHC RBC-ENTMCNC: 32.2 GM/DL — SIGNIFICANT CHANGE UP (ref 32–36)
MCV RBC AUTO: 80.4 FL — SIGNIFICANT CHANGE UP (ref 80–100)
NRBC # BLD: 0 /100 WBCS — SIGNIFICANT CHANGE UP (ref 0–0)
PLATELET # BLD AUTO: 382 K/UL — SIGNIFICANT CHANGE UP (ref 150–400)
POTASSIUM SERPL-MCNC: 3.9 MMOL/L — SIGNIFICANT CHANGE UP (ref 3.5–5.3)
POTASSIUM SERPL-SCNC: 3.9 MMOL/L — SIGNIFICANT CHANGE UP (ref 3.5–5.3)
RBC # BLD: 4.94 M/UL — SIGNIFICANT CHANGE UP (ref 3.8–5.2)
RBC # FLD: 14 % — SIGNIFICANT CHANGE UP (ref 10.3–14.5)
SODIUM SERPL-SCNC: 135 MMOL/L — SIGNIFICANT CHANGE UP (ref 135–145)
SPECIMEN SOURCE: SIGNIFICANT CHANGE UP
SPECIMEN SOURCE: SIGNIFICANT CHANGE UP
WBC # BLD: 10 K/UL — SIGNIFICANT CHANGE UP (ref 3.8–10.5)
WBC # FLD AUTO: 10 K/UL — SIGNIFICANT CHANGE UP (ref 3.8–10.5)

## 2020-11-03 PROCEDURE — 99233 SBSQ HOSP IP/OBS HIGH 50: CPT

## 2020-11-03 RX ADMIN — MUPIROCIN 1 APPLICATION(S): 20 OINTMENT TOPICAL at 06:27

## 2020-11-03 RX ADMIN — Medication 100 MILLIGRAM(S): at 21:07

## 2020-11-03 RX ADMIN — Medication 100 MILLIGRAM(S): at 06:27

## 2020-11-03 RX ADMIN — MORPHINE SULFATE 2 MILLIGRAM(S): 50 CAPSULE, EXTENDED RELEASE ORAL at 11:37

## 2020-11-03 RX ADMIN — Medication 100 MILLIGRAM(S): at 13:47

## 2020-11-03 RX ADMIN — BUPROPION HYDROCHLORIDE 150 MILLIGRAM(S): 150 TABLET, EXTENDED RELEASE ORAL at 12:51

## 2020-11-03 RX ADMIN — Medication 100 MILLIGRAM(S): at 12:51

## 2020-11-03 RX ADMIN — MORPHINE SULFATE 2 MILLIGRAM(S): 50 CAPSULE, EXTENDED RELEASE ORAL at 11:06

## 2020-11-03 RX ADMIN — ENOXAPARIN SODIUM 40 MILLIGRAM(S): 100 INJECTION SUBCUTANEOUS at 12:51

## 2020-11-03 RX ADMIN — MORPHINE SULFATE 2 MILLIGRAM(S): 50 CAPSULE, EXTENDED RELEASE ORAL at 19:38

## 2020-11-03 RX ADMIN — MORPHINE SULFATE 2 MILLIGRAM(S): 50 CAPSULE, EXTENDED RELEASE ORAL at 20:08

## 2020-11-03 RX ADMIN — MUPIROCIN 1 APPLICATION(S): 20 OINTMENT TOPICAL at 18:06

## 2020-11-03 NOTE — PROGRESS NOTE ADULT - PROBLEM SELECTOR PROBLEM 5
DVT prophylaxis - Hypercoagulable state 2/2 chronic Afib   - HR at goal. Not a candidate for beta blockers given chronic hypotension.  - On warfarin.  hold coumadin tonight for supra-therapeutic NR   - c/w amiodarone; as per discussion with Dr. Davis, would continue with Amiodarone despite pulmonary fibrosis.

## 2020-11-03 NOTE — CDI QUERY NOTE - NSCDIOTHERTXTBX_GEN_ALL_CORE_HH
The patient was admitted with left leg cellulitis and abscess.    Per H&P and initial progress notes, the patient also met sepsis criteria.  ED vital signs were:  T-98.8   P-132   BP-101/72   R-20        WBC - 18.15     Lactate - 2.2    Please clarify the status of sepsis, if known?  = admitted with sepsis, resolving or resolved,  = sepsis ruled out,  = other,  = unable to determine.    Thank you.

## 2020-11-03 NOTE — PROGRESS NOTE ADULT - PROBLEM SELECTOR PLAN 1
- s/p I&D in ER  - c/w clindamycin IV 600mg TID  - Blood cultures NGTD  - wound care consult pending  - weight bearing as tolerated   - tylenol PRN for pain; percocet if severe pain given NSAID allergy  - Surgery evaluation-- ct left leg with contrast no abscess  - left hand swelling-- US ordered   - Morphine 2mg IV prn pain

## 2020-11-03 NOTE — PROGRESS NOTE ADULT - SUBJECTIVE AND OBJECTIVE BOX
Patients pain in leg has improved.     GENERAL: No fevers, no chills.  EYES: No blurry vision,  No photophobia  ENT: No sore throat.  No dysphagia  Cardiovascular: No chest pain, palpitations, orthopnea  Pulmonary: No cough, no wheezing. No shortness of breath  Gastrointestinal: No abdominal pain, no diarrhea, no constipation.    Musculoskeletal: No weakness.  No myalgias.  Dermatology:  No rashes.  Neuro: No Headache.  No vertigo.  No dizziness.  Psych: No anxiety, no depression.  Denies suicidal thoughts.    MEDICATIONS  (STANDING):  buPROPion XL . 150 milliGRAM(s) Oral daily  clindamycin IVPB 600 milliGRAM(s) IV Intermittent every 8 hours  enoxaparin Injectable 40 milliGRAM(s) SubCutaneous daily  influenza   Vaccine 0.5 milliLiter(s) IntraMuscular once  mupirocin 2% Ointment 1 Application(s) Topical two times a day  topiramate 100 milliGRAM(s) Oral daily    MEDICATIONS  (PRN):  acetaminophen   Tablet .. 975 milliGRAM(s) Oral every 8 hours PRN Moderate Pain (4 - 6)  morphine  - Injectable 2 milliGRAM(s) IV Push every 4 hours PRN Severe Pain (7 - 10)    Vital Signs Last 24 Hrs  T(C): 36.5 (03 Nov 2020 13:19), Max: 36.7 (02 Nov 2020 17:32)  T(F): 97.7 (03 Nov 2020 13:19), Max: 98.1 (02 Nov 2020 17:32)  HR: 86 (03 Nov 2020 13:19) (86 - 105)  BP: 104/71 (03 Nov 2020 13:19) (100/68 - 121/76)  BP(mean): --  RR: 18 (03 Nov 2020 13:19) (18 - 18)  SpO2: 99% (03 Nov 2020 13:19) (96% - 99%)    GENERAL: NAD, well-developed  HEAD:  Atraumatic, Normocephalic  EYES: EOMI, PERRLA, conjunctiva and sclera clear  NECK: Supple, No JVD  CHEST/LUNG: Clear to auscultation bilaterally; No wheeze  HEART: Regular rate and rhythm; No murmurs, rubs, or gallops  ABDOMEN: Soft, Nontender, Nondistended; Bowel sounds present  EXTREMITIES:  2+ Peripheral Pulses, No clubbing, cyanosis, or edema. LLE wound more firm and tender. + warmth  PSYCH: AAOx3  NEUROLOGY: non-focal  SKIN: No rashes or lesions    .  LABS:                         12.8   10.00 )-----------( 382      ( 03 Nov 2020 07:01 )             39.7     11-03    135  |  100  |  13  ----------------------------<  97  3.9   |  22  |  0.89    Ca    10.1      03 Nov 2020 06:55                RADIOLOGY, EKG & ADDITIONAL TESTS: Reviewed.

## 2020-11-03 NOTE — PROVIDER CONTACT NOTE (OTHER) - ACTION/TREATMENT ORDERED:
Pt was educated on importance of taking medications
Surgery team called and will come to change leg dressing.
Reassess BP within the hour.

## 2020-11-03 NOTE — PROVIDER CONTACT NOTE (OTHER) - BACKGROUND
Pt stated that she did not take meds for 1 week and did not want to take it
LLE wound.
Pt admitted on 10/30 w/ diagnosis of LLE cellulitis.

## 2020-11-04 ENCOUNTER — TRANSCRIPTION ENCOUNTER (OUTPATIENT)
Age: 25
End: 2020-11-04

## 2020-11-04 VITALS
OXYGEN SATURATION: 97 % | SYSTOLIC BLOOD PRESSURE: 138 MMHG | TEMPERATURE: 97 F | DIASTOLIC BLOOD PRESSURE: 84 MMHG | HEART RATE: 86 BPM | RESPIRATION RATE: 18 BRPM

## 2020-11-04 LAB
ANION GAP SERPL CALC-SCNC: 14 MMOL/L — SIGNIFICANT CHANGE UP (ref 5–17)
BUN SERPL-MCNC: 13 MG/DL — SIGNIFICANT CHANGE UP (ref 7–23)
CALCIUM SERPL-MCNC: 9.7 MG/DL — SIGNIFICANT CHANGE UP (ref 8.4–10.5)
CHLORIDE SERPL-SCNC: 103 MMOL/L — SIGNIFICANT CHANGE UP (ref 96–108)
CO2 SERPL-SCNC: 21 MMOL/L — LOW (ref 22–31)
CREAT SERPL-MCNC: 0.81 MG/DL — SIGNIFICANT CHANGE UP (ref 0.5–1.3)
GLUCOSE SERPL-MCNC: 101 MG/DL — HIGH (ref 70–99)
HCT VFR BLD CALC: 39 % — SIGNIFICANT CHANGE UP (ref 34.5–45)
HGB BLD-MCNC: 12.7 G/DL — SIGNIFICANT CHANGE UP (ref 11.5–15.5)
MCHC RBC-ENTMCNC: 25.8 PG — LOW (ref 27–34)
MCHC RBC-ENTMCNC: 32.6 GM/DL — SIGNIFICANT CHANGE UP (ref 32–36)
MCV RBC AUTO: 79.3 FL — LOW (ref 80–100)
NRBC # BLD: 0 /100 WBCS — SIGNIFICANT CHANGE UP (ref 0–0)
PLATELET # BLD AUTO: 399 K/UL — SIGNIFICANT CHANGE UP (ref 150–400)
POTASSIUM SERPL-MCNC: 3.7 MMOL/L — SIGNIFICANT CHANGE UP (ref 3.5–5.3)
POTASSIUM SERPL-SCNC: 3.7 MMOL/L — SIGNIFICANT CHANGE UP (ref 3.5–5.3)
RBC # BLD: 4.92 M/UL — SIGNIFICANT CHANGE UP (ref 3.8–5.2)
RBC # FLD: 14.1 % — SIGNIFICANT CHANGE UP (ref 10.3–14.5)
SODIUM SERPL-SCNC: 138 MMOL/L — SIGNIFICANT CHANGE UP (ref 135–145)
WBC # BLD: 10.09 K/UL — SIGNIFICANT CHANGE UP (ref 3.8–10.5)
WBC # FLD AUTO: 10.09 K/UL — SIGNIFICANT CHANGE UP (ref 3.8–10.5)

## 2020-11-04 PROCEDURE — U0003: CPT

## 2020-11-04 PROCEDURE — 73701 CT LOWER EXTREMITY W/DYE: CPT

## 2020-11-04 PROCEDURE — 84132 ASSAY OF SERUM POTASSIUM: CPT

## 2020-11-04 PROCEDURE — 85014 HEMATOCRIT: CPT

## 2020-11-04 PROCEDURE — 99231 SBSQ HOSP IP/OBS SF/LOW 25: CPT

## 2020-11-04 PROCEDURE — 82330 ASSAY OF CALCIUM: CPT

## 2020-11-04 PROCEDURE — 87040 BLOOD CULTURE FOR BACTERIA: CPT

## 2020-11-04 PROCEDURE — 84295 ASSAY OF SERUM SODIUM: CPT

## 2020-11-04 PROCEDURE — 87086 URINE CULTURE/COLONY COUNT: CPT

## 2020-11-04 PROCEDURE — 96361 HYDRATE IV INFUSION ADD-ON: CPT | Mod: XU

## 2020-11-04 PROCEDURE — 99285 EMERGENCY DEPT VISIT HI MDM: CPT | Mod: 25

## 2020-11-04 PROCEDURE — 80053 COMPREHEN METABOLIC PANEL: CPT

## 2020-11-04 PROCEDURE — G0378: CPT

## 2020-11-04 PROCEDURE — 83605 ASSAY OF LACTIC ACID: CPT

## 2020-11-04 PROCEDURE — 96376 TX/PRO/DX INJ SAME DRUG ADON: CPT | Mod: XU

## 2020-11-04 PROCEDURE — 73590 X-RAY EXAM OF LOWER LEG: CPT

## 2020-11-04 PROCEDURE — 80048 BASIC METABOLIC PNL TOTAL CA: CPT

## 2020-11-04 PROCEDURE — 85018 HEMOGLOBIN: CPT

## 2020-11-04 PROCEDURE — 99239 HOSP IP/OBS DSCHRG MGMT >30: CPT

## 2020-11-04 PROCEDURE — 85610 PROTHROMBIN TIME: CPT

## 2020-11-04 PROCEDURE — 96365 THER/PROPH/DIAG IV INF INIT: CPT | Mod: XU

## 2020-11-04 PROCEDURE — 82947 ASSAY GLUCOSE BLOOD QUANT: CPT

## 2020-11-04 PROCEDURE — 85730 THROMBOPLASTIN TIME PARTIAL: CPT

## 2020-11-04 PROCEDURE — 81001 URINALYSIS AUTO W/SCOPE: CPT

## 2020-11-04 PROCEDURE — 96375 TX/PRO/DX INJ NEW DRUG ADDON: CPT | Mod: XU

## 2020-11-04 PROCEDURE — 82803 BLOOD GASES ANY COMBINATION: CPT

## 2020-11-04 PROCEDURE — 93005 ELECTROCARDIOGRAM TRACING: CPT | Mod: XU

## 2020-11-04 PROCEDURE — 85027 COMPLETE CBC AUTOMATED: CPT

## 2020-11-04 PROCEDURE — 10061 I&D ABSCESS COMP/MULTIPLE: CPT

## 2020-11-04 PROCEDURE — 82435 ASSAY OF BLOOD CHLORIDE: CPT

## 2020-11-04 PROCEDURE — 85025 COMPLETE CBC W/AUTO DIFF WBC: CPT

## 2020-11-04 RX ADMIN — ENOXAPARIN SODIUM 40 MILLIGRAM(S): 100 INJECTION SUBCUTANEOUS at 12:35

## 2020-11-04 RX ADMIN — Medication 100 MILLIGRAM(S): at 05:31

## 2020-11-04 RX ADMIN — BUPROPION HYDROCHLORIDE 150 MILLIGRAM(S): 150 TABLET, EXTENDED RELEASE ORAL at 12:35

## 2020-11-04 RX ADMIN — Medication 975 MILLIGRAM(S): at 12:38

## 2020-11-04 RX ADMIN — Medication 100 MILLIGRAM(S): at 12:35

## 2020-11-04 RX ADMIN — MORPHINE SULFATE 2 MILLIGRAM(S): 50 CAPSULE, EXTENDED RELEASE ORAL at 09:45

## 2020-11-04 RX ADMIN — MUPIROCIN 1 APPLICATION(S): 20 OINTMENT TOPICAL at 05:31

## 2020-11-04 RX ADMIN — Medication 975 MILLIGRAM(S): at 13:20

## 2020-11-04 RX ADMIN — MORPHINE SULFATE 2 MILLIGRAM(S): 50 CAPSULE, EXTENDED RELEASE ORAL at 09:14

## 2020-11-04 NOTE — DISCHARGE NOTE PROVIDER - CARE PROVIDER_API CALL
Rodriguez Landrum J  SURGERY  [address] [place] [address]  Phone: [phone]  Fax: [phone]  Follow Up Time:

## 2020-11-04 NOTE — DISCHARGE NOTE PROVIDER - NSDCMRMEDTOKEN_GEN_ALL_CORE_FT
Adderall: 30 mg ER once daily  topiramate 100 mg oral capsule, extended release: 1 cap(s) orally once a day  Wellbutrin: 30mg  orally once caily   Adderall: 30 mg ER once daily  clindamycin 300 mg oral capsule: 1 cap(s) orally 4 times a day   oxycodone-acetaminophen 5 mg-325 mg oral tablet: 1 tab(s) orally every 8 hours, As Needed MDD:3  topiramate 100 mg oral capsule, extended release: 1 cap(s) orally once a day  Wellbutrin: 30mg  orally once caily

## 2020-11-04 NOTE — DISCHARGE NOTE NURSING/CASE MANAGEMENT/SOCIAL WORK - PATIENT PORTAL LINK FT
You can access the FollowMyHealth Patient Portal offered by [facility] by registering at the following website: [email or web address]. By joining [facility]’s FollowMyHealth portal, you will also be able to view your health information using other applications (apps) compatible with our system.

## 2020-11-04 NOTE — PROGRESS NOTE ADULT - ATTENDING COMMENTS
seen and examined 11-04-20 @ 0950    afeb  1 cm open wound on left lateral leg without surrounding erythema or warmth. no drainage.    WBC = 10    s/p I&D of left leg abscess on 10/29/2020 by ER  -clindamycin (10/29 - ?)  -replace dry dressing over wound daily  -she can f/u in my office to monitor wound healing. call [phone] for an appointment.
disposition: dc 11/4  1 hour spent in preparation of discharge  discussed with patients father at bedside  discussed with NP Jo-Ann Driscoll D.O.  Hospitalist Pager # [phone]
disposition: pending improvement of sepsis, ct leg and US hand pending  discussed with MARYSE Driscoll D.O.  Hospitalist Pager # [phone]
disposition: pending improvement of sepsis-- needs surgery change of packing-- dc planning  discussed with MARYSE Driscoll D.O.  Hospitalist Pager # [phone]

## 2020-11-04 NOTE — PROGRESS NOTE ADULT - ASSESSMENT
25F who presented with left lower extremity pustule s/p I&D in the ED and started on IV antibiotics. Surgery consulted for worsening swelling, pain, and erythema.     - wound packed, please pack with 1/2 inch plain packing daily with curlex wrap  - continue with IV antibiotics per medicine team     ACS  [id number] 25F who presented with left lower extremity pustule s/p I&D in the ED and started on IV antibiotics. Surgery consulted for worsening swelling, pain, and erythema.     - wound dressing changed on AM rounds, please cover with dry sterile gauze and tape daily or sooner if it become saturated or soiled  - continue with IV antibiotics per medicine team     ACS  [id number] 25F who presented with left lower extremity pustule s/p I&D in the ED and started on IV antibiotics. Surgery consulted for worsening swelling, pain, and erythema.     - wound dressing changed on AM rounds, please cover with dry sterile gauze and tape daily or sooner if it become saturated or soiled  - continue with IV antibiotics per medicine team   -Please call with any further questions.  Patient may follow up PRN with Dr. Fadi Donaldson     [facility]  [id number]

## 2020-11-04 NOTE — DISCHARGE NOTE PROVIDER - NSDCCPCAREPLAN_GEN_ALL_CORE_FT
PRINCIPAL DISCHARGE DIAGNOSIS  Diagnosis: Cellulitis and abscess of leg  Assessment and Plan of Treatment: complete a course of antibiotics  s/p I & D by surgery team   c/w dressing changes per surgery reccs      SECONDARY DISCHARGE DIAGNOSES  Diagnosis: Attention deficit hyperactivity disorder (ADHD), unspecified ADHD type  Assessment and Plan of Treatment: c/w meds as prescribed

## 2020-11-04 NOTE — PROGRESS NOTE ADULT - SUBJECTIVE AND OBJECTIVE BOX
SURGERY DAILY PROGRESS NOTE:       SUBJECTIVE/ROS: Patient seen and evaluated on AM rounds. Patient with no acute overnight events. Denies pain in left leg.          OBJECTIVE:    Vital Signs Last 24 Hrs  T(C): 36.3 (04 Nov 2020 05:59), Max: 36.8 (03 Nov 2020 17:16)  T(F): 97.3 (04 Nov 2020 05:59), Max: 98.3 (03 Nov 2020 17:16)  HR: 78 (04 Nov 2020 05:59) (78 - 105)  BP: 100/65 (04 Nov 2020 05:59) (100/65 - 131/83)  BP(mean): --  RR: 18 (04 Nov 2020 05:59) (18 - 18)  SpO2: 98% (04 Nov 2020 05:59) (97% - 99%)  I&O's Detail    03 Nov 2020 07:01  -  04 Nov 2020 07:00  --------------------------------------------------------  IN:    IV PiggyBack: 150 mL    Oral Fluid: 930 mL  Total IN: 1080 mL    OUT:  Total OUT: 0 mL    Total NET: 1080 mL        Daily     Daily   MEDICATIONS  (STANDING):  buPROPion XL . 150 milliGRAM(s) Oral daily  clindamycin IVPB 600 milliGRAM(s) IV Intermittent every 8 hours  enoxaparin Injectable 40 milliGRAM(s) SubCutaneous daily  influenza   Vaccine 0.5 milliLiter(s) IntraMuscular once  mupirocin 2% Ointment 1 Application(s) Topical two times a day  topiramate 100 milliGRAM(s) Oral daily    MEDICATIONS  (PRN):  acetaminophen   Tablet .. 975 milliGRAM(s) Oral every 8 hours PRN Moderate Pain (4 - 6)  morphine  - Injectable 2 milliGRAM(s) IV Push every 4 hours PRN Severe Pain (7 - 10)      LABS:                        12.8   10.00 )-----------( 382      ( 03 Nov 2020 07:01 )             39.7     11-03    135  |  100  |  13  ----------------------------<  97  3.9   |  22  |  0.89    Ca    10.1      03 Nov 2020 06:55      Physical Exam:   General: NAD, resting comfortably  Pulmonary: non labored breathing on room air   Abdominal: soft, ND/NT, no organomegaly  Extremities: WWP, normal strength, no clubbing/cyanosis/edema. Left lower extremity medial [id number] wound with surrounding induration resolving

## 2020-11-04 NOTE — PROGRESS NOTE ADULT - SUBJECTIVE AND OBJECTIVE BOX
Patients pain in leg is resolving.    GENERAL: No fevers, no chills.  EYES: No blurry vision,  No photophobia  ENT: No sore throat.  No dysphagia  Cardiovascular: No chest pain, palpitations, orthopnea  Pulmonary: No cough, no wheezing. No shortness of breath  Gastrointestinal: No abdominal pain, no diarrhea, no constipation.    Musculoskeletal: No weakness.  No myalgias.  Dermatology:  No rashes.  Neuro: No Headache.  No vertigo.  No dizziness.  Psych: No anxiety, no depression.  Denies suicidal thoughts.    MEDICATIONS  (STANDING):  buPROPion XL . 150 milliGRAM(s) Oral daily  clindamycin IVPB 600 milliGRAM(s) IV Intermittent every 8 hours  enoxaparin Injectable 40 milliGRAM(s) SubCutaneous daily  influenza   Vaccine 0.5 milliLiter(s) IntraMuscular once  mupirocin 2% Ointment 1 Application(s) Topical two times a day  topiramate 100 milliGRAM(s) Oral daily    MEDICATIONS  (PRN):  acetaminophen   Tablet .. 975 milliGRAM(s) Oral every 8 hours PRN Moderate Pain (4 - 6)    Vital Signs Last 24 Hrs  T(C): 36.2 (04 Nov 2020 14:50), Max: 36.8 (03 Nov 2020 17:16)  T(F): 97.1 (04 Nov 2020 14:50), Max: 98.3 (03 Nov 2020 17:16)  HR: 86 (04 Nov 2020 14:50) (78 - 97)  BP: 138/84 (04 Nov 2020 14:50) (100/65 - 138/84)  BP(mean): --  RR: 18 (04 Nov 2020 14:50) (18 - 18)  SpO2: 97% (04 Nov 2020 14:50) (97% - 99%)    GENERAL: NAD, well-developed  HEAD:  Atraumatic, Normocephalic  EYES: EOMI, PERRLA, conjunctiva and sclera clear  NECK: Supple, No JVD  CHEST/LUNG: Clear to auscultation bilaterally; No wheeze  HEART: Regular rate and rhythm; No murmurs, rubs, or gallops  ABDOMEN: Soft, Nontender, Nondistended; Bowel sounds present  EXTREMITIES:  2+ Peripheral Pulses, No clubbing, cyanosis, or edema. LLE wound dressed  PSYCH: AAOx3  NEUROLOGY: non-focal  SKIN: No rashes or lesions    .  LABS:                         12.7   10.09 )-----------( 399      ( 04 Nov 2020 07:11 )             39.0     11-04    138  |  103  |  13  ----------------------------<  101<H>  3.7   |  21<L>  |  0.81    Ca    9.7      04 Nov 2020 07:11                RADIOLOGY, EKG & ADDITIONAL TESTS: Reviewed.

## 2020-11-04 NOTE — PROGRESS NOTE ADULT - PROBLEM SELECTOR PLAN 1
- s/p I&D in ER  - transition clindamycin to PO and treat for a total of 10 days from I&D on 10/29  - Blood cultures NGTD  - wound care by surgery-- outpatient f/u and given care instructions.  - weight bearing as tolerated   - tylenol PRN for pain; percocet if severe pain given NSAID allergy  - Surgery evaluation-- ct left leg with contrast no abscess

## 2020-11-04 NOTE — DISCHARGE NOTE PROVIDER - HOSPITAL COURSE
25F who presented with left lower extremity pustule s/p I&D in the ED and started on IV antibiotics. Surgery consulted for worsening swelling, pain, and erythema.

## 2020-11-09 PROBLEM — F90.9 ATTENTION-DEFICIT HYPERACTIVITY DISORDER, UNSPECIFIED TYPE: Chronic | Status: ACTIVE | Noted: 2020-10-29

## 2020-11-09 PROBLEM — F31.9 BIPOLAR DISORDER, UNSPECIFIED: Chronic | Status: ACTIVE | Noted: 2020-10-29

## 2020-11-09 PROBLEM — F32.9 MAJOR DEPRESSIVE DISORDER, SINGLE EPISODE, UNSPECIFIED: Chronic | Status: ACTIVE | Noted: 2020-10-29

## 2020-11-10 ENCOUNTER — APPOINTMENT (OUTPATIENT)
Dept: TRAUMA SURGERY | Facility: CLINIC | Age: 25
End: 2020-11-10
Payer: COMMERCIAL

## 2020-11-10 VITALS
DIASTOLIC BLOOD PRESSURE: 67 MMHG | WEIGHT: 190 LBS | BODY MASS INDEX: 35.87 KG/M2 | SYSTOLIC BLOOD PRESSURE: 115 MMHG | TEMPERATURE: 98.3 F | HEART RATE: 105 BPM | HEIGHT: 61 IN

## 2020-11-10 PROCEDURE — 99072 ADDL SUPL MATRL&STAF TM PHE: CPT

## 2020-11-10 PROCEDURE — 99212 OFFICE O/P EST SF 10 MIN: CPT

## 2020-11-10 NOTE — PHYSICAL EXAM
[Alert] : alert [Oriented to Person] : oriented to person [Oriented to Place] : oriented to place [Oriented to Time] : oriented to time [Calm] : calm [de-identified] : appears well [de-identified] : ambulates with normal gait [de-identified] : 2 cm clean superficial wound a few centimeters above left medial malleolus. no warmth, erythema or fluctuance to suggest ongoing infection. minimal surrounding induration from wound healing.

## 2020-11-10 NOTE — HISTORY OF PRESENT ILLNESS
[de-identified] : 10/29/2020 - I&D of left leg abscess by ER\par discharged home on 11/4 with Rx clindamycin x 4 days to complete a 10 day course. [de-identified] : no fevers or chills\par wound is itchy

## 2020-11-10 NOTE — REASON FOR VISIT
[Post Hospitalization] : a post hospitalization visit [FreeTextEntry1] : admitted 10/29 - 11/4/2020 for left leg cellulitis

## 2020-12-16 ENCOUNTER — APPOINTMENT (OUTPATIENT)
Dept: TRAUMA SURGERY | Facility: CLINIC | Age: 25
End: 2020-12-16
Payer: COMMERCIAL

## 2020-12-16 VITALS
DIASTOLIC BLOOD PRESSURE: 98 MMHG | SYSTOLIC BLOOD PRESSURE: 125 MMHG | TEMPERATURE: 97.7 F | WEIGHT: 200 LBS | BODY MASS INDEX: 37.76 KG/M2 | HEIGHT: 61 IN | HEART RATE: 118 BPM

## 2020-12-16 DIAGNOSIS — L02.416 CELLULITIS OF LEFT LOWER LIMB: ICD-10-CM

## 2020-12-16 DIAGNOSIS — L03.116 CELLULITIS OF LEFT LOWER LIMB: ICD-10-CM

## 2020-12-16 PROCEDURE — 99072 ADDL SUPL MATRL&STAF TM PHE: CPT

## 2020-12-16 PROCEDURE — 99212 OFFICE O/P EST SF 10 MIN: CPT

## 2020-12-16 NOTE — HISTORY OF PRESENT ILLNESS
[de-identified] : 10/29/2020 - I&D of left leg abscess by ER\par seen in the office on 11/10 and the 2 cm wound a few centimeters above her left medial malleolus was healing well without evidence of recurrent infection.\par  [de-identified] : no fevers or chills\par she states that the wound is itchy and she picks at it occasionally, which is how she developed the initial infection

## 2020-12-16 NOTE — PHYSICAL EXAM
[de-identified] : appears well [de-identified] : 6 mm wound a few centimeters above her left medial malleolus continues to heal well without evidence of recurrent infection

## 2021-01-14 NOTE — DISCHARGE NOTE NURSING/CASE MANAGEMENT/SOCIAL WORK - NSDCVIVACCINE_GEN_ALL_CORE_FT
Patient has a follow up appointment in February.    Medication: Gabapentin    Pharmacy:  [facility]    Ordering Provider: Gwendolyn Lamas NP    Preferred Contact Number:  [phone] (mobile)    Who will be picking up:  Patient    Advised patient that the nurse will call when the prescription is ready for   within 48-72 hours.      No Vaccines Administered.

## 2022-02-02 ENCOUNTER — LAB REQUISITION (OUTPATIENT)
Dept: LAB | Facility: HOSPITAL | Age: 27
End: 2022-02-02
Attending: PSYCHIATRY & NEUROLOGY
Payer: COMMERCIAL

## 2022-02-02 DIAGNOSIS — F10.239 ALCOHOL DEPENDENCE WITH WITHDRAWAL, UNSPECIFIED (CMS/HCC): ICD-10-CM

## 2022-02-02 LAB
ALBUMIN SERPL-MCNC: 4.1 G/DL (ref 3.4–5)
ALP SERPL-CCNC: 68 IU/L (ref 35–126)
ALT SERPL-CCNC: 11 IU/L (ref 11–54)
AMYLASE SERPL-CCNC: 42 U/L (ref 28–100)
ANION GAP SERPL CALC-SCNC: 14 MEQ/L (ref 3–15)
AST SERPL-CCNC: 13 IU/L (ref 15–41)
BILIRUB SERPL-MCNC: 0.7 MG/DL (ref 0.3–1.2)
BUN SERPL-MCNC: 8 MG/DL (ref 8–20)
CALCIUM SERPL-MCNC: 9.4 MG/DL (ref 8.9–10.3)
CHLORIDE SERPL-SCNC: 104 MEQ/L (ref 98–109)
CO2 SERPL-SCNC: 20 MEQ/L (ref 22–32)
CREAT SERPL-MCNC: 0.8 MG/DL (ref 0.6–1.1)
GFR SERPL CREATININE-BSD FRML MDRD: >60 ML/MIN/1.73M*2
GLUCOSE SERPL-MCNC: 82 MG/DL (ref 70–99)
POTASSIUM SERPL-SCNC: 3.9 MEQ/L (ref 3.6–5.1)
PROT SERPL-MCNC: 6.8 G/DL (ref 6–8.2)
SODIUM SERPL-SCNC: 138 MEQ/L (ref 136–144)

## 2022-02-02 PROCEDURE — 36415 COLL VENOUS BLD VENIPUNCTURE: CPT | Performed by: PSYCHIATRY & NEUROLOGY

## 2022-02-02 PROCEDURE — 82150 ASSAY OF AMYLASE: CPT | Performed by: PSYCHIATRY & NEUROLOGY

## 2022-02-02 PROCEDURE — 80053 COMPREHEN METABOLIC PANEL: CPT | Performed by: PSYCHIATRY & NEUROLOGY

## 2023-10-03 NOTE — H&P ADULT - GASTROINTESTINAL
pt back from echo/stress at this time. report given to [facility] AMY Diana. pt awaiting transport.
report received from night RN at shift change. pt sleeping at this time, arouses to name. respirations even and unlabored. pt denies cp/sob at this time. sinus bradycardia noted on CM. NAD noted. pt awaiting bed in observation unit. will continue to monitor.
Break RN: Pt is A&Ox4, resting in stretcher with no complaints at this time. Respirations even and unlabored, chest rise equal b/l. Sinus rhythm noted on cardiac monitor. VS as noted in flow sheets. Pt denies chest pain, SOB, fever, cough, chills, abdominal pain, N/V/D, h/a, dizziness, numbness/tingling or any urinary symptoms at this time. No acute distress noted. Repeat troponin collected and sent. Safety maintained throughout. Will continue to monitor.
Pt resting comfortably w/ no c/o of pain of discomfort
Pt denies any pain, SOB, dizziness, palpitations. no distress noted. sinus rhythm on tele. Pt was informed of the NPO after midnight for cardiac cath. safety maintained ,call bell  with in reach. will continue to monitor
detailed exam

## 2025-06-16 NOTE — CDI QUERY NOTE - NSCDINOTEPOA_GEN_ALL_CORE_FT
Your child has been evaluated in the Emergency Department today for their head injury.     Please follow up with your child’s pediatrician within two days.    Return to ER  immediately if your child has any of the following problems:  Worsening headaches (irritability/inconsolability)  Continued vomiting or recurrent vomiting  An unusual increase in the amount of time sleeping or if is difficult to awaken  Lethargy  Behavior change or confusion  Weakness or inability to move one or more of his arms or legs; difficulty walking  Stumbling or clumsiness  Visual changes, abnormal eye movements, or one pupil (black [place] seen in the center of the eye) that is much larger or different from the pupil in the other eye.  Seizures or seizure like movements such as jerking of the arms/legs with or without eye rolling  Bleeding or clear water-like discharge from the ears or nose.  Increased swelling of the head at the site of the injury    
Was the condition present on admission? If so, please document in the chart that “(the condition) was present on admission.”